# Patient Record
Sex: FEMALE | Race: WHITE | NOT HISPANIC OR LATINO | Employment: FULL TIME | URBAN - METROPOLITAN AREA
[De-identification: names, ages, dates, MRNs, and addresses within clinical notes are randomized per-mention and may not be internally consistent; named-entity substitution may affect disease eponyms.]

---

## 2020-07-01 ENCOUNTER — OFFICE VISIT (OUTPATIENT)
Dept: OBSTETRICS AND GYNECOLOGY | Facility: CLINIC | Age: 45
End: 2020-07-01

## 2020-07-01 VITALS
BODY MASS INDEX: 24.96 KG/M2 | HEIGHT: 67 IN | WEIGHT: 159 LBS | DIASTOLIC BLOOD PRESSURE: 90 MMHG | TEMPERATURE: 97.3 F | SYSTOLIC BLOOD PRESSURE: 140 MMHG

## 2020-07-01 DIAGNOSIS — N92.0 MENORRHAGIA WITH REGULAR CYCLE: ICD-10-CM

## 2020-07-01 DIAGNOSIS — D25.1 INTRAMURAL LEIOMYOMA OF UTERUS: Primary | ICD-10-CM

## 2020-07-01 PROCEDURE — 99203 OFFICE O/P NEW LOW 30 MIN: CPT | Performed by: OBSTETRICS & GYNECOLOGY

## 2020-07-01 RX ORDER — ZOLPIDEM TARTRATE 5 MG/1
1 TABLET ORAL NIGHTLY
COMMUNITY
Start: 2020-06-26 | End: 2020-08-27

## 2020-07-01 RX ORDER — TRIAMTERENE AND HYDROCHLOROTHIAZIDE 37.5; 25 MG/1; MG/1
1 TABLET ORAL AS NEEDED
COMMUNITY

## 2020-07-01 RX ORDER — ELECTROLYTES/DEXTROSE
1 SOLUTION, ORAL ORAL DAILY
COMMUNITY
End: 2020-09-12 | Stop reason: HOSPADM

## 2020-07-01 NOTE — PROGRESS NOTES
Chief Complaint   Patient presents with   • Gynecologic Exam     patient has been seeing GYN in [place]. had pelvic ultrasound last week that showed fibroids and adenomyosis.  patient has been told that she needs hysterectomy.  pap 2020.       Elana Orantes is a 44 y.o. year old  presenting to be seen in consultation from KANDIS Humphrey for evaluation of heavy menses and uterine leiomyoma.  This patient has been told by her gynecologist in [place] that she needs a hysterectomy.  She has had 3 vaginal deliveries which were uncomplicated.  Her  has had a vasectomy.  She has completed her childbearing.  Her menses have become extremely heavy with passage of clots, lasting 5 days.  She has cramping with her periods.  She denies intermenstrual bleeding.  If she has surgery she prefers to have it here.  She has a history of nephrolithiasis, with prior basket retrieval of the stone.  She has no urinary symptoms.  She has no bowel symptoms.    Social History     Substance and Sexual Activity   Sexual Activity Yes   • Partners: Male   • Birth control/protection: Surgical    Comment:  vasectomy     SCREENING TESTS    Year 2012 2016 2017 2018 2019 2020   Age                         PAP         Neg.                HPV high risk                         Mammogram                         GURPREET score                         Breast MRI                         Lipids                         Vitamin D                         Colonoscopy                         DEXA  Frax (hip/any)                         Ovarian Screen                             No Additional Complaints Reported    The following portions of the patient's history were reviewed and updated as appropriate:vital signs and   She  has a past medical history of Fibroid, HPV (human papilloma virus) infection, and Kidney stone.  She does not have any pertinent  "problems on file.  She  has a past surgical history that includes Kidney stone surgery.  Her family history includes Coronary artery disease in her father; Diabetes in her father; Neurofibromatosis in her father.  She  reports that she has never smoked. She has never used smokeless tobacco. She reports that she does not drink alcohol or use drugs.  Current Outpatient Medications   Medication Sig Dispense Refill   • Multiple Vitamins-Minerals (MULTIVITAMIN ADULT) tablet Take 1 tablet by mouth Daily.     • Probiotic Product (PROBIOTIC-10 PO) Take 1 tablet by mouth Daily.     • triamterene-hydrochlorothiazide (MAXZIDE-25) 37.5-25 MG per tablet Take 1 tablet by mouth As Needed.     • zolpidem (AMBIEN) 5 MG tablet Take 1 tablet by mouth Every Night.       No current facility-administered medications for this visit.      She has No Known Allergies..        Review of Systems  A review of systems was done.  She denies cough, fever, and shortness of breath.  Constitutional: negative for fever, chills, activity change, appetite change, fatigue and unexpected weight change.  Respiratory: negative  Cardiovascular: negative  Gastrointestinal: negative  Genitourinary:negative  Musculoskeletal:negative  Behavioral/Psych: negative          /90   Temp 97.3 °F (36.3 °C)   Ht 170.2 cm (67\")   Wt 72.1 kg (159 lb)   LMP 06/29/2020 (Exact Date)   Breastfeeding No   BMI 24.90 kg/m²     Physical Exam    General:  alert; cooperative; well developed; well nourished   Skin:  No suspicious lesions seen   Thyroid: normal to inspection and palpation   Lungs:  clear to auscultation bilaterally   Heart:  regular rate and rhythm, S1, S2 normal, no murmur, click, rub or gallop   Breasts:  Not performed.  by request   Abdomen: soft, non-tender; no masses  no umbilical or inguinal hernias are present  no hepato-splenomegaly   Pelvis: Clinical staff was present for exam  External genitalia:  normal appearance of the external genitalia " including Bartholin's and [place]'s glands.  Vaginal:  normal pink mucosa without prolapse or lesions. blood present -  dark red;  Cervix:  normal appearance.  Uterus:  asymmetrically enlarged, consistent with 6-7 weeks size;  Adnexa:  normal bimanual exam of the adnexa.  Rectal:  anus visually normal appearing. recto-vaginal exam unremarkable and confirms findings;     Lab Review   No data reviewed    Imaging   No data reviewed    Medical counseling was given to patient for the following topics: diagnostic results, instructions for management, risk factor reductions, prognosis, impressions and risks and benefits of treatment options . Total time of the encounter was 34 minute(s) and 21 minute(s)  was spent in face to face counseling.  I have advised the patient that she has an enlarged uterus which is irregular and consistent with leiomyomata.  I have advised her that this is likely the cause of her heavy menses with passage of clots.  I have reviewed with the patient options of therapy including hysteroscopy with attempted myomectomy versus minimally-invasive hysterectomy.  The patient desires removal of her uterus/cervix/fallopian tubes.  She is uncertain about whether she desires to have her ovaries removed.  She has numerous questions about the surgical procedure and I have reviewed with her robotic-assistance.  I discussed the surgical risks of bowel, bladder, ureteral injury; bleeding, infection, and anesthetic risks.  The patient will watch the video of robotic hysterectomy today.  She voiced understanding of surgical risks.  She desires to be scheduled.          ASSESSMENT  Problems Addressed this Visit        Genitourinary    Intramural leiomyoma of uterus - Primary    Menorrhagia with regular cycle           Substance History:    reports that she has never smoked. She has never used smokeless tobacco.    reports that she does not drink alcohol.    reports that she does not use drugs.    Substance use  counseling is not indicated based on patient history.      PLAN    · Medications prescribed this encounter:  No orders of the defined types were placed in this encounter.  · Information about robotically-assisted total laparoscopic hysterectomy.  · The patient will obtain her ultrasound report.  She desires to be scheduled at [facility].  · Follow up: 8 week(s)  · Calcium, 600 mg/ Vit. D, 400 IU daily     *Please note that portions of this documentation may have been completed with a voice recognition program.  Efforts were made to edit this dictation, but occasional words may have been mistranscribed.     This note was electronically signed.    SALUD Buck MD  July 1, 2020  09:47

## 2020-07-20 ENCOUNTER — TELEPHONE (OUTPATIENT)
Dept: OBSTETRICS AND GYNECOLOGY | Facility: CLINIC | Age: 45
End: 2020-07-20

## 2020-07-20 NOTE — TELEPHONE ENCOUNTER
I have called the patient to indicate that we have reviewed the disc from her pelvic ultrasound done in [facility].  She had been told there that she had an intramural uterine fibroid.  The ultrasound does not appear to confirm that but there is an anechoic space in the posterior myometrium.  The endometrial stripe is good.  Her ovaries are normal.  She has developed menorrhagia with extremely heavy flow and passage of clots lasting 5 days each month.  Her  is previously had a vasectomy.  It is her desire to have her uterus, cervix, and fallopian tubes removed.  We have discussed a minimally-invasive approach with robotic assistance.  She desires to proceed with this procedure.  I have recommended that she retain her ovaries.

## 2020-08-27 ENCOUNTER — APPOINTMENT (OUTPATIENT)
Dept: PREADMISSION TESTING | Facility: HOSPITAL | Age: 45
End: 2020-08-27

## 2020-08-27 ENCOUNTER — OFFICE VISIT (OUTPATIENT)
Dept: OBSTETRICS AND GYNECOLOGY | Facility: CLINIC | Age: 45
End: 2020-08-27

## 2020-08-27 VITALS
SYSTOLIC BLOOD PRESSURE: 120 MMHG | HEIGHT: 67 IN | OXYGEN SATURATION: 98 % | WEIGHT: 161.6 LBS | BODY MASS INDEX: 25.36 KG/M2 | TEMPERATURE: 98 F | DIASTOLIC BLOOD PRESSURE: 80 MMHG | HEART RATE: 82 BPM

## 2020-08-27 VITALS — WEIGHT: 161.38 LBS | HEIGHT: 67 IN | BODY MASS INDEX: 25.33 KG/M2

## 2020-08-27 DIAGNOSIS — D25.1 INTRAMURAL LEIOMYOMA OF UTERUS: ICD-10-CM

## 2020-08-27 DIAGNOSIS — Z01.818 PREOPERATIVE TESTING: ICD-10-CM

## 2020-08-27 DIAGNOSIS — N92.0 MENORRHAGIA WITH REGULAR CYCLE: Primary | ICD-10-CM

## 2020-08-27 LAB
ALBUMIN SERPL-MCNC: 4.4 G/DL (ref 3.5–5.2)
ALBUMIN/GLOB SERPL: 2.4 G/DL
ALP SERPL-CCNC: 61 U/L (ref 39–117)
ALT SERPL W P-5'-P-CCNC: 14 U/L (ref 1–33)
ANION GAP SERPL CALCULATED.3IONS-SCNC: 7 MMOL/L (ref 5–15)
AST SERPL-CCNC: 15 U/L (ref 1–32)
BACTERIA UR QL AUTO: ABNORMAL /HPF
BASOPHILS # BLD AUTO: 0.07 10*3/MM3 (ref 0–0.2)
BASOPHILS NFR BLD AUTO: 1.4 % (ref 0–1.5)
BILIRUB SERPL-MCNC: 0.2 MG/DL (ref 0–1.2)
BILIRUB UR QL STRIP: NEGATIVE
BUN SERPL-MCNC: 12 MG/DL (ref 6–20)
BUN/CREAT SERPL: 13.3 (ref 7–25)
CALCIUM SPEC-SCNC: 8.9 MG/DL (ref 8.6–10.5)
CHLORIDE SERPL-SCNC: 105 MMOL/L (ref 98–107)
CLARITY UR: CLEAR
CO2 SERPL-SCNC: 28 MMOL/L (ref 22–29)
COLOR UR: YELLOW
CREAT SERPL-MCNC: 0.9 MG/DL (ref 0.57–1)
DEPRECATED RDW RBC AUTO: 42.7 FL (ref 37–54)
EOSINOPHIL # BLD AUTO: 0.27 10*3/MM3 (ref 0–0.4)
EOSINOPHIL NFR BLD AUTO: 5.2 % (ref 0.3–6.2)
ERYTHROCYTE [DISTWIDTH] IN BLOOD BY AUTOMATED COUNT: 12.4 % (ref 12.3–15.4)
GFR SERPL CREATININE-BSD FRML MDRD: 68 ML/MIN/1.73
GLOBULIN UR ELPH-MCNC: 1.8 GM/DL
GLUCOSE SERPL-MCNC: 87 MG/DL (ref 65–99)
GLUCOSE UR STRIP-MCNC: NEGATIVE MG/DL
HCT VFR BLD AUTO: 37.7 % (ref 34–46.6)
HGB BLD-MCNC: 12.6 G/DL (ref 12–15.9)
HGB UR QL STRIP.AUTO: ABNORMAL
HYALINE CASTS UR QL AUTO: ABNORMAL /LPF
IMM GRANULOCYTES # BLD AUTO: 0.01 10*3/MM3 (ref 0–0.05)
IMM GRANULOCYTES NFR BLD AUTO: 0.2 % (ref 0–0.5)
KETONES UR QL STRIP: NEGATIVE
LEUKOCYTE ESTERASE UR QL STRIP.AUTO: ABNORMAL
LYMPHOCYTES # BLD AUTO: 1.25 10*3/MM3 (ref 0.7–3.1)
LYMPHOCYTES NFR BLD AUTO: 24.3 % (ref 19.6–45.3)
MCH RBC QN AUTO: 31.5 PG (ref 26.6–33)
MCHC RBC AUTO-ENTMCNC: 33.4 G/DL (ref 31.5–35.7)
MCV RBC AUTO: 94.3 FL (ref 79–97)
MONOCYTES # BLD AUTO: 0.47 10*3/MM3 (ref 0.1–0.9)
MONOCYTES NFR BLD AUTO: 9.1 % (ref 5–12)
NEUTROPHILS NFR BLD AUTO: 3.08 10*3/MM3 (ref 1.7–7)
NEUTROPHILS NFR BLD AUTO: 59.8 % (ref 42.7–76)
NITRITE UR QL STRIP: NEGATIVE
NRBC BLD AUTO-RTO: 0 /100 WBC (ref 0–0.2)
PH UR STRIP.AUTO: 8 [PH] (ref 5–8)
PLATELET # BLD AUTO: 291 10*3/MM3 (ref 140–450)
PMV BLD AUTO: 9.7 FL (ref 6–12)
POTASSIUM SERPL-SCNC: 4.3 MMOL/L (ref 3.5–5.2)
PROT SERPL-MCNC: 6.2 G/DL (ref 6–8.5)
PROT UR QL STRIP: NEGATIVE
RBC # BLD AUTO: 4 10*6/MM3 (ref 3.77–5.28)
RBC # UR: ABNORMAL /HPF
REF LAB TEST METHOD: ABNORMAL
SODIUM SERPL-SCNC: 140 MMOL/L (ref 136–145)
SP GR UR STRIP: 1.01 (ref 1–1.03)
SQUAMOUS #/AREA URNS HPF: ABNORMAL /HPF
UROBILINOGEN UR QL STRIP: ABNORMAL
WBC # BLD AUTO: 5.15 10*3/MM3 (ref 3.4–10.8)
WBC UR QL AUTO: ABNORMAL /HPF

## 2020-08-27 PROCEDURE — 85025 COMPLETE CBC W/AUTO DIFF WBC: CPT | Performed by: OBSTETRICS & GYNECOLOGY

## 2020-08-27 PROCEDURE — S0260 H&P FOR SURGERY: HCPCS | Performed by: OBSTETRICS & GYNECOLOGY

## 2020-08-27 PROCEDURE — 81001 URINALYSIS AUTO W/SCOPE: CPT | Performed by: OBSTETRICS & GYNECOLOGY

## 2020-08-27 PROCEDURE — 80053 COMPREHEN METABOLIC PANEL: CPT | Performed by: OBSTETRICS & GYNECOLOGY

## 2020-08-27 PROCEDURE — 36415 COLL VENOUS BLD VENIPUNCTURE: CPT

## 2020-08-27 RX ORDER — OXYCODONE AND ACETAMINOPHEN 7.5; 325 MG/1; MG/1
1 TABLET ORAL EVERY 6 HOURS PRN
Qty: 18 TABLET | Refills: 0 | Status: SHIPPED | OUTPATIENT
Start: 2020-08-31 | End: 2020-09-12 | Stop reason: HOSPADM

## 2020-08-27 RX ORDER — DOCUSATE SODIUM 100 MG/1
100 CAPSULE, LIQUID FILLED ORAL 2 TIMES DAILY
Qty: 20 CAPSULE | Refills: 0 | Status: SHIPPED | OUTPATIENT
Start: 2020-08-31

## 2020-08-27 RX ORDER — IBUPROFEN 600 MG/1
600 TABLET ORAL EVERY 6 HOURS PRN
Qty: 40 TABLET | Refills: 0 | Status: ON HOLD | OUTPATIENT
Start: 2020-08-31 | End: 2020-09-12

## 2020-08-27 RX ORDER — OMEPRAZOLE 20 MG/1
1 CAPSULE, DELAYED RELEASE ORAL DAILY
COMMUNITY
Start: 2020-08-13

## 2020-08-27 NOTE — H&P
History and Physical    Chief Complaint: Heavy menses with passage of clots and a uterine fibroid.    Elana Orantes is a 44 y.o., , , who was seen in my office on 2020 in consultation from KANDIS Humphrey, for evaluation of the above complaints.  She has been told by her gynecologist in [place] that she needs a hysterectomy and the patient does not desire to have that procedure done there.  She has had 3 uncomplicated vaginal deliveries.  Her  has had a vasectomy.  Her menses are extremely heavy with passage of clots.  She does not have intermenstrual bleeding.  Her pelvic ultrasound confirmed an intramural uterine leiomyoma.  She desires removal of her uterus/cervix/fallopian tubes and desires to retain her ovaries.  I have recommended a minimally-invasive approach to surgery.  She has watched an informational tape about robotic hysterectomy.  The surgical risks of bowel, bladder, ureteral injury; bleeding, infection, and anesthetic risks have been explained to the patient.  She voiced understanding of these risks.  Informed consent was signed.  She does have a history of nephrolithiasis.    Past Medical History:   Diagnosis Date   • Fibroid    • HPV (human papilloma virus) infection    • Kidney stone        Allergies: Patient has no known allergies.    Medications:   Current Outpatient Medications:   •  [START ON 2020] docusate sodium (Colace) 100 MG capsule, Take 1 capsule by mouth 2 (Two) Times a Day., Disp: 20 capsule, Rfl: 0  •  [START ON 2020] ibuprofen (ADVIL,MOTRIN) 600 MG tablet, Take 1 tablet by mouth Every 6 (Six) Hours As Needed for Mild Pain  for up to 10 days., Disp: 40 tablet, Rfl: 0  •  Multiple Vitamins-Minerals (MULTIVITAMIN ADULT) tablet, Take 1 tablet by mouth Daily., Disp: , Rfl:   •  omeprazole (priLOSEC) 20 MG capsule, Take 1 capsule by mouth Daily., Disp: , Rfl:   •  [START ON 2020] oxyCODONE-acetaminophen (Percocet) 7.5-325 MG per tablet, Take 1  tablet by mouth Every 6 (Six) Hours As Needed for Moderate Pain ., Disp: 16 tablet, Rfl: 0  •  Probiotic Product (PROBIOTIC-10 PO), Take 1 tablet by mouth Daily., Disp: , Rfl:   •  triamterene-hydrochlorothiazide (MAXZIDE-25) 37.5-25 MG per tablet, Take 1 tablet by mouth As Needed., Disp: , Rfl:     Previous Surgery:   Past Surgical History:   Procedure Laterality Date   • KIDNEY STONE SURGERY     • LAPAROSCOPIC CHOLECYSTECTOMY         Review of Systems  A review of systems was negative.  She denies cough, fever, shortness of breath, and loss of her sense of taste or smell.  Constitutional: negative for fever, chills, activity change, appetite change, fatigue and unexpected weight change.  Respiratory: negative  Cardiovascular: negative  Gastrointestinal: negative  Genitourinary:negative  Musculoskeletal:negative  Behavioral/Psych: negative      Social History     Tobacco Use   • Smoking status: Never Smoker   • Smokeless tobacco: Never Used   Substance Use Topics   • Alcohol use: Never     Frequency: Never       Recent Vitals       7/1/2020 7/1/2020 8/27/2020       BP:  --  140/90  120/80     Pulse:  --  --  82     Temp:  97.3 °F (36.3 °C)  97.3 °F (36.3 °C)  98 °F (36.7 °C)     Weight:  72.1 kg (159 lb)  72.1 kg (159 lb)  73.3 kg (161 lb 9.6 oz)     BMI (Calculated):  24.9  24.9  25.3           Physical Exam  General:  alert; cooperative; well developed; well nourished   Skin:  No suspicious lesions seen   Thyroid: normal to inspection and palpation   Lungs:  clear to auscultation bilaterally   Heart:  regular rate and rhythm, S1, S2 normal, no murmur, click, rub or gallop   Breasts:  Not performed.   Abdomen: soft, non-tender; no masses  no umbilical or inguinal hernias are present  no hepato-splenomegaly   Pelvis: Clinical staff was present for exam  External genitalia:  normal appearance of the external genitalia including Bartholin's and [place]'s glands.  Vaginal:  normal pink mucosa without prolapse or  lesions.  Cervix:  normal appearance.  Uterus:  asymmetrically enlarged, consistent with 8 weeks size;  Adnexa:  normal bimanual exam of the adnexa.  Rectal:  anus visually normal appearing. recto-vaginal exam unremarkable and confirms findings;         Injury to bowel, Injury to bladder, Injury to ureter, bleeding, infection and anesthestic risks were explained to the patient and she voiced understanding. Informed consent was signed.    No contraindications to planned surgery were detected      Impression: 1) Menorrhagia with regular cycles [N92.0]; 2) Intramural uterine leiomyoma [D25.1]        Plan: 1) Robotically-assisted total laparoscopic hysterectomy/bilateral salpingectomy/vaginal cuff suspension to uterosacral ligaments      *Please note that portions of this documentation may have been completed with a voice recognition program.  Efforts were made to edit this dictation, but occasional words may have been mistranscribed.  This note was electronically signed.    SALUD Buck MD  August 27, 2020  14:47

## 2020-08-28 ENCOUNTER — APPOINTMENT (OUTPATIENT)
Dept: PREADMISSION TESTING | Facility: HOSPITAL | Age: 45
End: 2020-08-28

## 2020-08-28 PROCEDURE — U0002 COVID-19 LAB TEST NON-CDC: HCPCS

## 2020-08-28 PROCEDURE — C9803 HOPD COVID-19 SPEC COLLECT: HCPCS

## 2020-08-28 PROCEDURE — U0004 COV-19 TEST NON-CDC HGH THRU: HCPCS

## 2020-08-29 LAB
REF LAB TEST METHOD: NORMAL
SARS-COV-2 RNA RESP QL NAA+PROBE: NOT DETECTED

## 2020-08-30 ENCOUNTER — ANESTHESIA EVENT (OUTPATIENT)
Dept: PERIOP | Facility: HOSPITAL | Age: 45
End: 2020-08-30

## 2020-08-30 RX ORDER — FAMOTIDINE 10 MG/ML
20 INJECTION, SOLUTION INTRAVENOUS ONCE
Status: CANCELLED | OUTPATIENT
Start: 2020-08-30 | End: 2020-08-30

## 2020-08-31 ENCOUNTER — ANESTHESIA (OUTPATIENT)
Dept: PERIOP | Facility: HOSPITAL | Age: 45
End: 2020-08-31

## 2020-08-31 ENCOUNTER — HOSPITAL ENCOUNTER (OUTPATIENT)
Facility: HOSPITAL | Age: 45
Discharge: HOME | End: 2020-08-31
Attending: OBSTETRICS & GYNECOLOGY | Admitting: OBSTETRICS & GYNECOLOGY

## 2020-08-31 VITALS
BODY MASS INDEX: 25.27 KG/M2 | TEMPERATURE: 97.4 F | SYSTOLIC BLOOD PRESSURE: 115 MMHG | RESPIRATION RATE: 16 BRPM | HEIGHT: 67 IN | HEART RATE: 84 BPM | DIASTOLIC BLOOD PRESSURE: 76 MMHG | OXYGEN SATURATION: 100 % | WEIGHT: 161 LBS

## 2020-08-31 DIAGNOSIS — D25.1 INTRAMURAL LEIOMYOMA OF UTERUS: ICD-10-CM

## 2020-08-31 DIAGNOSIS — N92.0 MENORRHAGIA WITH REGULAR CYCLE: ICD-10-CM

## 2020-08-31 LAB
B-HCG UR QL: NEGATIVE
INTERNAL NEGATIVE CONTROL: NEGATIVE
INTERNAL POSITIVE CONTROL: POSITIVE
Lab: NORMAL
POTASSIUM SERPL-SCNC: 3.8 MMOL/L (ref 3.5–5.2)

## 2020-08-31 PROCEDURE — 58571 TLH W/T/O 250 G OR LESS: CPT

## 2020-08-31 PROCEDURE — 25010000002 CEFOXITIN PER 1 G: Performed by: OBSTETRICS & GYNECOLOGY

## 2020-08-31 PROCEDURE — 25010000002 FENTANYL CITRATE (PF) 100 MCG/2ML SOLUTION: Performed by: NURSE ANESTHETIST, CERTIFIED REGISTERED

## 2020-08-31 PROCEDURE — C9290 INJ, BUPIVACAINE LIPOSOME: HCPCS | Performed by: OBSTETRICS & GYNECOLOGY

## 2020-08-31 PROCEDURE — 84132 ASSAY OF SERUM POTASSIUM: CPT | Performed by: ANESTHESIOLOGY

## 2020-08-31 PROCEDURE — 25010000002 PROPOFOL 10 MG/ML EMULSION: Performed by: NURSE ANESTHETIST, CERTIFIED REGISTERED

## 2020-08-31 PROCEDURE — 25010000002 ONDANSETRON PER 1 MG: Performed by: NURSE ANESTHETIST, CERTIFIED REGISTERED

## 2020-08-31 PROCEDURE — 88307 TISSUE EXAM BY PATHOLOGIST: CPT | Performed by: OBSTETRICS & GYNECOLOGY

## 2020-08-31 PROCEDURE — 25010000003 BUPIVACAINE LIPOSOME 1.3 % SUSPENSION: Performed by: OBSTETRICS & GYNECOLOGY

## 2020-08-31 PROCEDURE — 94799 UNLISTED PULMONARY SVC/PX: CPT

## 2020-08-31 PROCEDURE — 81025 URINE PREGNANCY TEST: CPT | Performed by: ANESTHESIOLOGY

## 2020-08-31 PROCEDURE — 58571 TLH W/T/O 250 G OR LESS: CPT | Performed by: OBSTETRICS & GYNECOLOGY

## 2020-08-31 PROCEDURE — 57283 COLPOPEXY INTRAPERITONEAL: CPT | Performed by: OBSTETRICS & GYNECOLOGY

## 2020-08-31 PROCEDURE — 25010000002 KETOROLAC TROMETHAMINE PER 15 MG: Performed by: NURSE ANESTHETIST, CERTIFIED REGISTERED

## 2020-08-31 PROCEDURE — 25010000002 DEXAMETHASONE SODIUM PHOSPHATE 10 MG/ML SOLUTION: Performed by: NURSE ANESTHETIST, CERTIFIED REGISTERED

## 2020-08-31 PROCEDURE — 25010000002 NEOSTIGMINE 10 MG/10ML SOLUTION: Performed by: NURSE ANESTHETIST, CERTIFIED REGISTERED

## 2020-08-31 PROCEDURE — 57283 COLPOPEXY INTRAPERITONEAL: CPT

## 2020-08-31 DEVICE — DEV CONTRL TISS STRATAFIX SPIRAL PD V7 SH 2-0 1/2 30CM: Type: IMPLANTABLE DEVICE | Status: FUNCTIONAL

## 2020-08-31 DEVICE — SEALANT WND FIBRIN TISSEEL PREFIL/SYR/PRIMAFZ 4ML: Type: IMPLANTABLE DEVICE | Status: FUNCTIONAL

## 2020-08-31 RX ORDER — OXYCODONE AND ACETAMINOPHEN 7.5; 325 MG/1; MG/1
1 TABLET ORAL EVERY 4 HOURS PRN
Status: DISCONTINUED | OUTPATIENT
Start: 2020-08-31 | End: 2020-08-31 | Stop reason: HOSPADM

## 2020-08-31 RX ORDER — FAMOTIDINE 20 MG/1
20 TABLET, FILM COATED ORAL ONCE
Status: DISCONTINUED | OUTPATIENT
Start: 2020-08-31 | End: 2020-08-31

## 2020-08-31 RX ORDER — ONDANSETRON 2 MG/ML
INJECTION INTRAMUSCULAR; INTRAVENOUS AS NEEDED
Status: DISCONTINUED | OUTPATIENT
Start: 2020-08-31 | End: 2020-08-31 | Stop reason: SURG

## 2020-08-31 RX ORDER — FENTANYL CITRATE 50 UG/ML
INJECTION, SOLUTION INTRAMUSCULAR; INTRAVENOUS AS NEEDED
Status: DISCONTINUED | OUTPATIENT
Start: 2020-08-31 | End: 2020-08-31 | Stop reason: SURG

## 2020-08-31 RX ORDER — PROMETHAZINE HYDROCHLORIDE 25 MG/1
12.5 TABLET ORAL EVERY 6 HOURS PRN
Status: DISCONTINUED | OUTPATIENT
Start: 2020-08-31 | End: 2020-08-31 | Stop reason: HOSPADM

## 2020-08-31 RX ORDER — IBUPROFEN 200 MG
200 TABLET ORAL EVERY 6 HOURS PRN
COMMUNITY
End: 2020-09-12 | Stop reason: HOSPADM

## 2020-08-31 RX ORDER — LIDOCAINE HYDROCHLORIDE 10 MG/ML
0.5 INJECTION, SOLUTION EPIDURAL; INFILTRATION; INTRACAUDAL; PERINEURAL ONCE AS NEEDED
Status: COMPLETED | OUTPATIENT
Start: 2020-08-31 | End: 2020-08-31

## 2020-08-31 RX ORDER — SODIUM CHLORIDE, SODIUM LACTATE, POTASSIUM CHLORIDE, CALCIUM CHLORIDE 600; 310; 30; 20 MG/100ML; MG/100ML; MG/100ML; MG/100ML
9 INJECTION, SOLUTION INTRAVENOUS CONTINUOUS
Status: DISCONTINUED | OUTPATIENT
Start: 2020-08-31 | End: 2020-08-31 | Stop reason: HOSPADM

## 2020-08-31 RX ORDER — DEXAMETHASONE SODIUM PHOSPHATE 10 MG/ML
INJECTION, SOLUTION INTRAMUSCULAR; INTRAVENOUS AS NEEDED
Status: DISCONTINUED | OUTPATIENT
Start: 2020-08-31 | End: 2020-08-31 | Stop reason: SURG

## 2020-08-31 RX ORDER — LIDOCAINE HYDROCHLORIDE 10 MG/ML
0.5 INJECTION, SOLUTION EPIDURAL; INFILTRATION; INTRACAUDAL; PERINEURAL ONCE AS NEEDED
Status: DISCONTINUED | OUTPATIENT
Start: 2020-08-31 | End: 2020-08-31

## 2020-08-31 RX ORDER — FAMOTIDINE 10 MG/ML
20 INJECTION, SOLUTION INTRAVENOUS ONCE
Status: DISCONTINUED | OUTPATIENT
Start: 2020-08-31 | End: 2020-08-31

## 2020-08-31 RX ORDER — GLYCOPYRROLATE 0.2 MG/ML
INJECTION INTRAMUSCULAR; INTRAVENOUS AS NEEDED
Status: DISCONTINUED | OUTPATIENT
Start: 2020-08-31 | End: 2020-08-31 | Stop reason: SURG

## 2020-08-31 RX ORDER — MAGNESIUM HYDROXIDE 1200 MG/15ML
LIQUID ORAL AS NEEDED
Status: DISCONTINUED | OUTPATIENT
Start: 2020-08-31 | End: 2020-08-31 | Stop reason: HOSPADM

## 2020-08-31 RX ORDER — LIDOCAINE HYDROCHLORIDE 10 MG/ML
INJECTION, SOLUTION EPIDURAL; INFILTRATION; INTRACAUDAL; PERINEURAL AS NEEDED
Status: DISCONTINUED | OUTPATIENT
Start: 2020-08-31 | End: 2020-08-31 | Stop reason: SURG

## 2020-08-31 RX ORDER — FENTANYL CITRATE 50 UG/ML
50 INJECTION, SOLUTION INTRAMUSCULAR; INTRAVENOUS
Status: DISCONTINUED | OUTPATIENT
Start: 2020-08-31 | End: 2020-08-31 | Stop reason: HOSPADM

## 2020-08-31 RX ORDER — SODIUM CHLORIDE, SODIUM LACTATE, POTASSIUM CHLORIDE, CALCIUM CHLORIDE 600; 310; 30; 20 MG/100ML; MG/100ML; MG/100ML; MG/100ML
9 INJECTION, SOLUTION INTRAVENOUS CONTINUOUS
Status: DISCONTINUED | OUTPATIENT
Start: 2020-08-31 | End: 2020-08-31

## 2020-08-31 RX ORDER — SODIUM CHLORIDE 0.9 % (FLUSH) 0.9 %
10 SYRINGE (ML) INJECTION AS NEEDED
Status: DISCONTINUED | OUTPATIENT
Start: 2020-08-31 | End: 2020-08-31 | Stop reason: HOSPADM

## 2020-08-31 RX ORDER — PROPOFOL 10 MG/ML
VIAL (ML) INTRAVENOUS AS NEEDED
Status: DISCONTINUED | OUTPATIENT
Start: 2020-08-31 | End: 2020-08-31 | Stop reason: SURG

## 2020-08-31 RX ORDER — SODIUM CHLORIDE 0.9 % (FLUSH) 0.9 %
10 SYRINGE (ML) INJECTION AS NEEDED
Status: DISCONTINUED | OUTPATIENT
Start: 2020-08-31 | End: 2020-08-31

## 2020-08-31 RX ORDER — NEOSTIGMINE METHYLSULFATE 1 MG/ML
INJECTION, SOLUTION INTRAVENOUS AS NEEDED
Status: DISCONTINUED | OUTPATIENT
Start: 2020-08-31 | End: 2020-08-31 | Stop reason: SURG

## 2020-08-31 RX ORDER — FAMOTIDINE 20 MG/1
20 TABLET, FILM COATED ORAL ONCE
Status: COMPLETED | OUTPATIENT
Start: 2020-08-31 | End: 2020-08-31

## 2020-08-31 RX ORDER — SODIUM CHLORIDE 9 MG/ML
INJECTION, SOLUTION INTRAVENOUS AS NEEDED
Status: DISCONTINUED | OUTPATIENT
Start: 2020-08-31 | End: 2020-08-31 | Stop reason: HOSPADM

## 2020-08-31 RX ORDER — ONDANSETRON 2 MG/ML
4 INJECTION INTRAMUSCULAR; INTRAVENOUS ONCE AS NEEDED
Status: DISCONTINUED | OUTPATIENT
Start: 2020-08-31 | End: 2020-08-31 | Stop reason: HOSPADM

## 2020-08-31 RX ORDER — HYDROMORPHONE HYDROCHLORIDE 1 MG/ML
0.5 INJECTION, SOLUTION INTRAMUSCULAR; INTRAVENOUS; SUBCUTANEOUS
Status: DISCONTINUED | OUTPATIENT
Start: 2020-08-31 | End: 2020-08-31 | Stop reason: HOSPADM

## 2020-08-31 RX ORDER — SODIUM CHLORIDE, SODIUM LACTATE, POTASSIUM CHLORIDE, CALCIUM CHLORIDE 600; 310; 30; 20 MG/100ML; MG/100ML; MG/100ML; MG/100ML
125 INJECTION, SOLUTION INTRAVENOUS CONTINUOUS
Status: DISCONTINUED | OUTPATIENT
Start: 2020-08-31 | End: 2020-08-31 | Stop reason: HOSPADM

## 2020-08-31 RX ORDER — ZOLPIDEM TARTRATE 5 MG/1
5 TABLET ORAL NIGHTLY PRN
COMMUNITY

## 2020-08-31 RX ORDER — ROCURONIUM BROMIDE 10 MG/ML
INJECTION, SOLUTION INTRAVENOUS AS NEEDED
Status: DISCONTINUED | OUTPATIENT
Start: 2020-08-31 | End: 2020-08-31 | Stop reason: SURG

## 2020-08-31 RX ORDER — KETOROLAC TROMETHAMINE 30 MG/ML
INJECTION, SOLUTION INTRAMUSCULAR; INTRAVENOUS AS NEEDED
Status: DISCONTINUED | OUTPATIENT
Start: 2020-08-31 | End: 2020-08-31 | Stop reason: SURG

## 2020-08-31 RX ORDER — NALOXONE HCL 0.4 MG/ML
0.1 VIAL (ML) INJECTION
Status: DISCONTINUED | OUTPATIENT
Start: 2020-08-31 | End: 2020-08-31 | Stop reason: HOSPADM

## 2020-08-31 RX ORDER — SCOLOPAMINE TRANSDERMAL SYSTEM 1 MG/1
1 PATCH, EXTENDED RELEASE TRANSDERMAL ONCE
Status: DISCONTINUED | OUTPATIENT
Start: 2020-08-31 | End: 2020-08-31 | Stop reason: HOSPADM

## 2020-08-31 RX ORDER — SODIUM CHLORIDE 0.9 % (FLUSH) 0.9 %
10 SYRINGE (ML) INJECTION EVERY 12 HOURS SCHEDULED
Status: DISCONTINUED | OUTPATIENT
Start: 2020-08-31 | End: 2020-08-31 | Stop reason: HOSPADM

## 2020-08-31 RX ORDER — SIMETHICONE 80 MG
80 TABLET,CHEWABLE ORAL 4 TIMES DAILY PRN
Status: DISCONTINUED | OUTPATIENT
Start: 2020-08-31 | End: 2020-08-31 | Stop reason: HOSPADM

## 2020-08-31 RX ORDER — EPHEDRINE SULFATE 50 MG/ML
INJECTION, SOLUTION INTRAVENOUS AS NEEDED
Status: DISCONTINUED | OUTPATIENT
Start: 2020-08-31 | End: 2020-08-31 | Stop reason: SURG

## 2020-08-31 RX ORDER — SODIUM CHLORIDE 0.9 % (FLUSH) 0.9 %
10 SYRINGE (ML) INJECTION EVERY 12 HOURS SCHEDULED
Status: DISCONTINUED | OUTPATIENT
Start: 2020-08-31 | End: 2020-08-31

## 2020-08-31 RX ADMIN — Medication 2 G: at 07:23

## 2020-08-31 RX ADMIN — GLYCOPYRROLATE 0.2 MG: 0.2 INJECTION INTRAMUSCULAR; INTRAVENOUS at 07:47

## 2020-08-31 RX ADMIN — FAMOTIDINE 20 MG: 20 TABLET, FILM COATED ORAL at 06:26

## 2020-08-31 RX ADMIN — NEOSTIGMINE 3 MG: 1 INJECTION INTRAVENOUS at 08:48

## 2020-08-31 RX ADMIN — FENTANYL CITRATE 50 MCG: 50 INJECTION, SOLUTION INTRAMUSCULAR; INTRAVENOUS at 07:20

## 2020-08-31 RX ADMIN — FENTANYL CITRATE 50 MCG: 50 INJECTION, SOLUTION INTRAMUSCULAR; INTRAVENOUS at 08:56

## 2020-08-31 RX ADMIN — GLYCOPYRROLATE 0.2 MG: 0.2 INJECTION INTRAMUSCULAR; INTRAVENOUS at 09:03

## 2020-08-31 RX ADMIN — ONDANSETRON 4 MG: 2 INJECTION INTRAMUSCULAR; INTRAVENOUS at 08:44

## 2020-08-31 RX ADMIN — FENTANYL CITRATE 50 MCG: 50 INJECTION, SOLUTION INTRAMUSCULAR; INTRAVENOUS at 07:41

## 2020-08-31 RX ADMIN — SODIUM CHLORIDE, POTASSIUM CHLORIDE, SODIUM LACTATE AND CALCIUM CHLORIDE 500 ML: 600; 310; 30; 20 INJECTION, SOLUTION INTRAVENOUS at 09:16

## 2020-08-31 RX ADMIN — OXYCODONE HYDROCHLORIDE AND ACETAMINOPHEN 1 TABLET: 7.5; 325 TABLET ORAL at 10:06

## 2020-08-31 RX ADMIN — SODIUM CHLORIDE, POTASSIUM CHLORIDE, SODIUM LACTATE AND CALCIUM CHLORIDE 9 ML/HR: 600; 310; 30; 20 INJECTION, SOLUTION INTRAVENOUS at 06:27

## 2020-08-31 RX ADMIN — PROPOFOL 25 MCG/KG/MIN: 10 INJECTION, EMULSION INTRAVENOUS at 07:23

## 2020-08-31 RX ADMIN — KETOROLAC TROMETHAMINE 30 MG: 30 INJECTION, SOLUTION INTRAMUSCULAR at 08:44

## 2020-08-31 RX ADMIN — SCOPALAMINE 1 PATCH: 1 PATCH, EXTENDED RELEASE TRANSDERMAL at 07:04

## 2020-08-31 RX ADMIN — GLYCOPYRROLATE 0.2 MG: 0.2 INJECTION INTRAMUSCULAR; INTRAVENOUS at 08:48

## 2020-08-31 RX ADMIN — DEXAMETHASONE SODIUM PHOSPHATE 4 MG: 10 INJECTION INTRAMUSCULAR; INTRAVENOUS at 07:23

## 2020-08-31 RX ADMIN — LIDOCAINE HYDROCHLORIDE 50 MG: 10 INJECTION, SOLUTION EPIDURAL; INFILTRATION; INTRACAUDAL; PERINEURAL at 07:23

## 2020-08-31 RX ADMIN — ROCURONIUM BROMIDE 50 MG: 10 INJECTION INTRAVENOUS at 07:23

## 2020-08-31 RX ADMIN — EPHEDRINE SULFATE 25 MG: 50 INJECTION INTRAVENOUS at 07:45

## 2020-08-31 RX ADMIN — LIDOCAINE HYDROCHLORIDE 0.5 ML: 10 INJECTION, SOLUTION EPIDURAL; INFILTRATION; INTRACAUDAL; PERINEURAL at 06:27

## 2020-08-31 RX ADMIN — PROPOFOL 200 MG: 10 INJECTION, EMULSION INTRAVENOUS at 07:23

## 2020-08-31 NOTE — ANESTHESIA POSTPROCEDURE EVALUATION
Patient: Elana Orantes    Procedure Summary     Date:  08/31/20 Room / Location:   [facility] [address] [place] [address] [facility]    Anesthesia Start:  0720 Anesthesia Stop:  0904    Procedure:  TOTAL LAPAROSCOPIC HYSTERECTOMY WITH VAGINAL VAULT SUSPENSION WITH DAVINCI ROBOT, BILATERAL SALPINGECTOMY (Bilateral Abdomen) Diagnosis:       Intramural leiomyoma of uterus      Menorrhagia with regular cycle      (Intramural leiomyoma of uterus [D25.1])      (Menorrhagia with regular cycle [N92.0])    Surgeon:  Karthik Buck MD Provider:  Arun Patricia MD    Anesthesia Type:  general ASA Status:  2          Anesthesia Type: general    Vitals  Vitals Value Taken Time   BP 94/54 8/31/2020  9:02 AM   Temp     Pulse 53 8/31/2020  9:02 AM   Resp 18 8/31/2020  9:04 AM   SpO2 100 % 8/31/2020  9:04 AM   Vitals shown include unvalidated device data.        Post Anesthesia Care and Evaluation    Patient location during evaluation: PACU  Patient participation: waiting for patient participation  Level of consciousness: responsive to physical stimuli  Pain score: 0  Pain management: adequate  Airway patency: patent  Anesthetic complications: No anesthetic complications  PONV Status: none  Cardiovascular status: hemodynamically stable and acceptable  Respiratory status: nonlabored ventilation, acceptable and nasal cannula  Hydration status: acceptable

## 2020-08-31 NOTE — ANESTHESIA PREPROCEDURE EVALUATION
Anesthesia Evaluation     Patient summary reviewed and Nursing notes reviewed   NPO Solid Status: > 8 hours  NPO Liquid Status: > 2 hours           Airway   Mallampati: II  TM distance: >3 FB  Neck ROM: full  No difficulty expected  Dental      Pulmonary    (-) COPD, asthma, recent URI, not a smoker  Cardiovascular     (-) hypertension, dysrhythmias, angina, hyperlipidemia      Neuro/Psych  (+) dizziness/light headedness, syncope,     (-) seizures, CVA  GI/Hepatic/Renal/Endo    (+)  GERD,  renal disease stones,   (-) liver disease, diabetes, no thyroid disorder    Musculoskeletal     Abdominal    Substance History      OB/GYN          Other                        Anesthesia Plan    ASA 2     general   (Propofol infusion as part of for anti PONV tech)  intravenous induction     Anesthetic plan, all risks, benefits, and alternatives have been provided, discussed and informed consent has been obtained with: patient.    Plan discussed with CRNA.

## 2020-08-31 NOTE — ANESTHESIA PROCEDURE NOTES
Airway  Urgency: elective    Date/Time: 8/31/2020 7:25 AM  Airway not difficult    General Information and Staff    Patient location during procedure: OR  CRNA: Saturnino Waggoner CRNA    Indications and Patient Condition  Indications for airway management: airway protection    Preoxygenated: yes  MILS not maintained throughout  Mask difficulty assessment: 1 - vent by mask    Final Airway Details  Final airway type: endotracheal airway      Successful airway: ETT  Cuffed: yes   Successful intubation technique: direct laryngoscopy  Endotracheal tube insertion site: oral  Blade: Fany  Blade size: 3  ETT size (mm): 7.0  Cormack-Lehane Classification: grade I - full view of glottis  Placement verified by: chest auscultation and capnometry   Measured from: lips  ETT/EBT  to lips (cm): 20  Number of attempts at approach: 1  Assessment: lips, teeth, and gum same as pre-op and atraumatic intubation    Additional Comments  Negative epigastric sounds, Breath sound equal bilaterally with symmetric chest rise and fall

## 2020-09-01 LAB
CYTO UR: NORMAL
LAB AP CASE REPORT: NORMAL
LAB AP CLINICAL INFORMATION: NORMAL
PATH REPORT.FINAL DX SPEC: NORMAL
PATH REPORT.GROSS SPEC: NORMAL

## 2020-09-10 ENCOUNTER — TELEPHONE (OUTPATIENT)
Dept: OBSTETRICS AND GYNECOLOGY | Facility: CLINIC | Age: 45
End: 2020-09-10

## 2020-09-10 NOTE — TELEPHONE ENCOUNTER
WENT TO PRIMARY CARE - GAVE HER MEDS FOR WHAT THE NURSE THOUGHT WAS INFECTION ON INCISIONS - PT STATES WAS NOT BOTHERING HER - SO THIS MORNING SHE HAS BUMPS ON THE INCISION - PLEASE CALL HER  [phone]  I

## 2020-09-10 NOTE — TELEPHONE ENCOUNTER
Dr. Buck,     Please call this patient.    This patient underwent a robotically-assisted total laparoscopic hysterectomy/bilateral salpingectomy/vaginal cuff suspension to uterosacral ligaments on 7/31/2020 at [facility].  She has had no postoperative problems but calls today indicating that yesterday she began to notice some redness around each of her 4 small incisions.  The incisions are all intact and dry.  There is no purulent drainage.  She did have adhesive glue applied to each incision and feels that there is a reaction to that.  She is not wearing Band-Aids or any adhesive tape.  Her primary care physician prescribed Keflex, however the patient is afebrile and with no evidence of infection at the site of the incisions I have suggested that she not take that medication.  I have advised her to soak the incisions in an attempt to get the glue off.  I have counseled her that she may take Benadryl for any itching or skin reaction.  She will contact me if her symptoms do not resolve.

## 2020-09-11 ENCOUNTER — APPOINTMENT (OUTPATIENT)
Dept: OTHER | Facility: HOSPITAL | Age: 45
End: 2020-09-11

## 2020-09-11 ENCOUNTER — HOSPITAL ENCOUNTER (OUTPATIENT)
Facility: HOSPITAL | Age: 45
Discharge: HOME | End: 2020-09-12
Attending: EMERGENCY MEDICINE | Admitting: UROLOGY

## 2020-09-11 ENCOUNTER — APPOINTMENT (OUTPATIENT)
Dept: GENERAL RADIOLOGY | Facility: HOSPITAL | Age: 45
End: 2020-09-11

## 2020-09-11 ENCOUNTER — DOCUMENTATION (OUTPATIENT)
Dept: OBSTETRICS AND GYNECOLOGY | Facility: CLINIC | Age: 45
End: 2020-09-11

## 2020-09-11 ENCOUNTER — ANESTHESIA EVENT (OUTPATIENT)
Dept: PERIOP | Facility: HOSPITAL | Age: 45
End: 2020-09-11

## 2020-09-11 ENCOUNTER — APPOINTMENT (OUTPATIENT)
Dept: INTERVENTIONAL RADIOLOGY/VASCULAR | Facility: HOSPITAL | Age: 45
End: 2020-09-11

## 2020-09-11 ENCOUNTER — ANESTHESIA (OUTPATIENT)
Dept: PERIOP | Facility: HOSPITAL | Age: 45
End: 2020-09-11

## 2020-09-11 DIAGNOSIS — Z90.710 STATUS POST HYSTERECTOMY: ICD-10-CM

## 2020-09-11 DIAGNOSIS — S37.10XA INJURY OF RIGHT URETER, INITIAL ENCOUNTER: Primary | ICD-10-CM

## 2020-09-11 DIAGNOSIS — D25.1 INTRAMURAL LEIOMYOMA OF UTERUS: ICD-10-CM

## 2020-09-11 DIAGNOSIS — S37.30XA INJURY OF URETHRA, INITIAL ENCOUNTER: ICD-10-CM

## 2020-09-11 DIAGNOSIS — R10.30 LOWER ABDOMINAL PAIN: ICD-10-CM

## 2020-09-11 LAB
POTASSIUM SERPL-SCNC: 4.1 MMOL/L (ref 3.5–5.2)
SARS-COV-2 RDRP RESP QL NAA+PROBE: NOT DETECTED

## 2020-09-11 PROCEDURE — G0378 HOSPITAL OBSERVATION PER HR: HCPCS

## 2020-09-11 PROCEDURE — 25010000002 HYDROMORPHONE PER 4 MG: Performed by: UROLOGY

## 2020-09-11 PROCEDURE — 25010000002 PROPOFOL 10 MG/ML EMULSION: Performed by: NURSE ANESTHETIST, CERTIFIED REGISTERED

## 2020-09-11 PROCEDURE — 84132 ASSAY OF SERUM POTASSIUM: CPT | Performed by: UROLOGY

## 2020-09-11 PROCEDURE — C1769 GUIDE WIRE: HCPCS | Performed by: UROLOGY

## 2020-09-11 PROCEDURE — 25010000002 IOPAMIDOL 61 % SOLUTION

## 2020-09-11 PROCEDURE — 25010000002 FENTANYL CITRATE (PF) 100 MCG/2ML SOLUTION: Performed by: RADIOLOGY

## 2020-09-11 PROCEDURE — 25010000002 IOPAMIDOL 61 % SOLUTION: Performed by: UROLOGY

## 2020-09-11 PROCEDURE — 99153 MOD SED SAME PHYS/QHP EA: CPT

## 2020-09-11 PROCEDURE — C1729 CATH, DRAINAGE: HCPCS

## 2020-09-11 PROCEDURE — 25010000003 LIDOCAINE 1 % SOLUTION: Performed by: NURSE ANESTHETIST, CERTIFIED REGISTERED

## 2020-09-11 PROCEDURE — 25010000002 DEXAMETHASONE PER 1 MG: Performed by: NURSE ANESTHETIST, CERTIFIED REGISTERED

## 2020-09-11 PROCEDURE — 25810000003 SODIUM CHLORIDE 0.9 % WITH KCL 20 MEQ 20-0.9 MEQ/L-% SOLUTION: Performed by: UROLOGY

## 2020-09-11 PROCEDURE — 25010000002 MIDAZOLAM PER 1 MG: Performed by: RADIOLOGY

## 2020-09-11 PROCEDURE — C1758 CATHETER, URETERAL: HCPCS | Performed by: UROLOGY

## 2020-09-11 PROCEDURE — 25010000002 FENTANYL CITRATE (PF) 100 MCG/2ML SOLUTION: Performed by: NURSE ANESTHETIST, CERTIFIED REGISTERED

## 2020-09-11 PROCEDURE — C1769 GUIDE WIRE: HCPCS

## 2020-09-11 PROCEDURE — 99152 MOD SED SAME PHYS/QHP 5/>YRS: CPT

## 2020-09-11 PROCEDURE — 25010000002 CEFOXITIN PER 1 G: Performed by: UROLOGY

## 2020-09-11 PROCEDURE — 76000 FLUOROSCOPY <1 HR PHYS/QHP: CPT

## 2020-09-11 PROCEDURE — 25010000002 HYDROMORPHONE 1 MG/ML SOLUTION: Performed by: UROLOGY

## 2020-09-11 PROCEDURE — 25010000002 DIPHENHYDRAMINE PER 50 MG

## 2020-09-11 PROCEDURE — 87635 SARS-COV-2 COVID-19 AMP PRB: CPT | Performed by: EMERGENCY MEDICINE

## 2020-09-11 PROCEDURE — 25010000002 ONDANSETRON PER 1 MG: Performed by: NURSE ANESTHETIST, CERTIFIED REGISTERED

## 2020-09-11 PROCEDURE — 99284 EMERGENCY DEPT VISIT MOD MDM: CPT

## 2020-09-11 RX ORDER — KETOROLAC TROMETHAMINE 30 MG/ML
30 INJECTION, SOLUTION INTRAMUSCULAR; INTRAVENOUS EVERY 6 HOURS PRN
Status: DISCONTINUED | OUTPATIENT
Start: 2020-09-11 | End: 2020-09-12 | Stop reason: HOSPADM

## 2020-09-11 RX ORDER — HYDROMORPHONE HYDROCHLORIDE 1 MG/ML
0.5 INJECTION, SOLUTION INTRAMUSCULAR; INTRAVENOUS; SUBCUTANEOUS
Status: DISCONTINUED | OUTPATIENT
Start: 2020-09-11 | End: 2020-09-12 | Stop reason: HOSPADM

## 2020-09-11 RX ORDER — SODIUM CHLORIDE 0.9 % (FLUSH) 0.9 %
10 SYRINGE (ML) INJECTION AS NEEDED
Status: DISCONTINUED | OUTPATIENT
Start: 2020-09-11 | End: 2020-09-11 | Stop reason: HOSPADM

## 2020-09-11 RX ORDER — FENTANYL CITRATE 50 UG/ML
INJECTION, SOLUTION INTRAMUSCULAR; INTRAVENOUS AS NEEDED
Status: DISCONTINUED | OUTPATIENT
Start: 2020-09-11 | End: 2020-09-11 | Stop reason: SURG

## 2020-09-11 RX ORDER — DEXAMETHASONE SODIUM PHOSPHATE 4 MG/ML
INJECTION, SOLUTION INTRA-ARTICULAR; INTRALESIONAL; INTRAMUSCULAR; INTRAVENOUS; SOFT TISSUE AS NEEDED
Status: DISCONTINUED | OUTPATIENT
Start: 2020-09-11 | End: 2020-09-11 | Stop reason: SURG

## 2020-09-11 RX ORDER — SODIUM CHLORIDE, SODIUM LACTATE, POTASSIUM CHLORIDE, CALCIUM CHLORIDE 600; 310; 30; 20 MG/100ML; MG/100ML; MG/100ML; MG/100ML
9 INJECTION, SOLUTION INTRAVENOUS CONTINUOUS
Status: DISCONTINUED | OUTPATIENT
Start: 2020-09-11 | End: 2020-09-12 | Stop reason: HOSPADM

## 2020-09-11 RX ORDER — LIDOCAINE HYDROCHLORIDE 10 MG/ML
0.5 INJECTION, SOLUTION EPIDURAL; INFILTRATION; INTRACAUDAL; PERINEURAL ONCE AS NEEDED
Status: CANCELLED | OUTPATIENT
Start: 2020-09-11

## 2020-09-11 RX ORDER — FENTANYL CITRATE 50 UG/ML
INJECTION, SOLUTION INTRAMUSCULAR; INTRAVENOUS
Status: COMPLETED | OUTPATIENT
Start: 2020-09-11 | End: 2020-09-11

## 2020-09-11 RX ORDER — PROPOFOL 10 MG/ML
VIAL (ML) INTRAVENOUS AS NEEDED
Status: DISCONTINUED | OUTPATIENT
Start: 2020-09-11 | End: 2020-09-11 | Stop reason: SURG

## 2020-09-11 RX ORDER — ONDANSETRON 2 MG/ML
INJECTION INTRAMUSCULAR; INTRAVENOUS AS NEEDED
Status: DISCONTINUED | OUTPATIENT
Start: 2020-09-11 | End: 2020-09-11 | Stop reason: SURG

## 2020-09-11 RX ORDER — FENTANYL CITRATE 50 UG/ML
INJECTION, SOLUTION INTRAMUSCULAR; INTRAVENOUS
Status: DISPENSED
Start: 2020-09-11 | End: 2020-09-12

## 2020-09-11 RX ORDER — GABAPENTIN 100 MG/1
100 CAPSULE ORAL 3 TIMES DAILY
Status: DISCONTINUED | OUTPATIENT
Start: 2020-09-11 | End: 2020-09-12 | Stop reason: HOSPADM

## 2020-09-11 RX ORDER — SODIUM CHLORIDE AND POTASSIUM CHLORIDE 150; 900 MG/100ML; MG/100ML
100 INJECTION, SOLUTION INTRAVENOUS CONTINUOUS
Status: DISCONTINUED | OUTPATIENT
Start: 2020-09-11 | End: 2020-09-12 | Stop reason: HOSPADM

## 2020-09-11 RX ORDER — ONDANSETRON 4 MG/1
4 TABLET, FILM COATED ORAL EVERY 6 HOURS PRN
Status: DISCONTINUED | OUTPATIENT
Start: 2020-09-11 | End: 2020-09-12 | Stop reason: HOSPADM

## 2020-09-11 RX ORDER — DIPHENHYDRAMINE HYDROCHLORIDE 50 MG/ML
12.5 INJECTION INTRAMUSCULAR; INTRAVENOUS ONCE
Status: COMPLETED | OUTPATIENT
Start: 2020-09-11 | End: 2020-09-11

## 2020-09-11 RX ORDER — ONDANSETRON 2 MG/ML
4 INJECTION INTRAMUSCULAR; INTRAVENOUS EVERY 6 HOURS PRN
Status: DISCONTINUED | OUTPATIENT
Start: 2020-09-11 | End: 2020-09-12 | Stop reason: HOSPADM

## 2020-09-11 RX ORDER — ONDANSETRON 2 MG/ML
4 INJECTION INTRAMUSCULAR; INTRAVENOUS ONCE AS NEEDED
Status: DISCONTINUED | OUTPATIENT
Start: 2020-09-11 | End: 2020-09-11 | Stop reason: HOSPADM

## 2020-09-11 RX ORDER — LABETALOL HYDROCHLORIDE 5 MG/ML
10 INJECTION, SOLUTION INTRAVENOUS EVERY 4 HOURS PRN
Status: DISCONTINUED | OUTPATIENT
Start: 2020-09-11 | End: 2020-09-12 | Stop reason: HOSPADM

## 2020-09-11 RX ORDER — ACETAMINOPHEN 160 MG/5ML
650 SOLUTION ORAL EVERY 6 HOURS
Status: DISCONTINUED | OUTPATIENT
Start: 2020-09-11 | End: 2020-09-12 | Stop reason: HOSPADM

## 2020-09-11 RX ORDER — FENTANYL CITRATE 50 UG/ML
50 INJECTION, SOLUTION INTRAMUSCULAR; INTRAVENOUS
Status: DISCONTINUED | OUTPATIENT
Start: 2020-09-11 | End: 2020-09-11 | Stop reason: HOSPADM

## 2020-09-11 RX ORDER — PANTOPRAZOLE SODIUM 40 MG/1
40 TABLET, DELAYED RELEASE ORAL DAILY
Status: DISCONTINUED | OUTPATIENT
Start: 2020-09-11 | End: 2020-09-12 | Stop reason: HOSPADM

## 2020-09-11 RX ORDER — MIDAZOLAM HYDROCHLORIDE 1 MG/ML
INJECTION INTRAMUSCULAR; INTRAVENOUS
Status: DISPENSED
Start: 2020-09-11 | End: 2020-09-12

## 2020-09-11 RX ORDER — DIPHENHYDRAMINE HYDROCHLORIDE 50 MG/ML
INJECTION INTRAMUSCULAR; INTRAVENOUS
Status: COMPLETED
Start: 2020-09-11 | End: 2020-09-11

## 2020-09-11 RX ORDER — LABETALOL HYDROCHLORIDE 5 MG/ML
5 INJECTION, SOLUTION INTRAVENOUS
Status: DISCONTINUED | OUTPATIENT
Start: 2020-09-11 | End: 2020-09-11 | Stop reason: HOSPADM

## 2020-09-11 RX ORDER — DIPHENHYDRAMINE HCL 25 MG
25 CAPSULE ORAL EVERY 6 HOURS PRN
Status: DISCONTINUED | OUTPATIENT
Start: 2020-09-11 | End: 2020-09-12 | Stop reason: HOSPADM

## 2020-09-11 RX ORDER — OXYCODONE HYDROCHLORIDE 5 MG/1
5 TABLET ORAL EVERY 4 HOURS PRN
Status: DISCONTINUED | OUTPATIENT
Start: 2020-09-11 | End: 2020-09-12 | Stop reason: HOSPADM

## 2020-09-11 RX ORDER — ZOLPIDEM TARTRATE 5 MG/1
5 TABLET ORAL NIGHTLY PRN
Status: DISCONTINUED | OUTPATIENT
Start: 2020-09-11 | End: 2020-09-12 | Stop reason: HOSPADM

## 2020-09-11 RX ORDER — LIDOCAINE HYDROCHLORIDE 10 MG/ML
INJECTION, SOLUTION INFILTRATION; PERINEURAL AS NEEDED
Status: DISCONTINUED | OUTPATIENT
Start: 2020-09-11 | End: 2020-09-11 | Stop reason: SURG

## 2020-09-11 RX ORDER — HYDROMORPHONE HYDROCHLORIDE 1 MG/ML
0.5 INJECTION, SOLUTION INTRAMUSCULAR; INTRAVENOUS; SUBCUTANEOUS
Status: DISCONTINUED | OUTPATIENT
Start: 2020-09-11 | End: 2020-09-11 | Stop reason: HOSPADM

## 2020-09-11 RX ORDER — CHOLECALCIFEROL (VITAMIN D3) 125 MCG
5 CAPSULE ORAL NIGHTLY PRN
Status: DISCONTINUED | OUTPATIENT
Start: 2020-09-11 | End: 2020-09-12 | Stop reason: HOSPADM

## 2020-09-11 RX ORDER — TRIAMTERENE AND HYDROCHLOROTHIAZIDE 37.5; 25 MG/1; MG/1
1 TABLET ORAL DAILY PRN
Status: DISCONTINUED | OUTPATIENT
Start: 2020-09-11 | End: 2020-09-12 | Stop reason: HOSPADM

## 2020-09-11 RX ORDER — MEPERIDINE HYDROCHLORIDE 25 MG/ML
12.5 INJECTION INTRAMUSCULAR; INTRAVENOUS; SUBCUTANEOUS
Status: DISCONTINUED | OUTPATIENT
Start: 2020-09-11 | End: 2020-09-11 | Stop reason: HOSPADM

## 2020-09-11 RX ORDER — NALOXONE HCL 0.4 MG/ML
0.4 VIAL (ML) INJECTION AS NEEDED
Status: DISCONTINUED | OUTPATIENT
Start: 2020-09-11 | End: 2020-09-11 | Stop reason: HOSPADM

## 2020-09-11 RX ORDER — FAMOTIDINE 20 MG/1
20 TABLET, FILM COATED ORAL ONCE
Status: COMPLETED | OUTPATIENT
Start: 2020-09-11 | End: 2020-09-11

## 2020-09-11 RX ORDER — ACETAMINOPHEN 650 MG/1
650 SUPPOSITORY RECTAL EVERY 6 HOURS
Status: DISCONTINUED | OUTPATIENT
Start: 2020-09-11 | End: 2020-09-12 | Stop reason: HOSPADM

## 2020-09-11 RX ORDER — DOCUSATE SODIUM 100 MG/1
100 CAPSULE, LIQUID FILLED ORAL 2 TIMES DAILY PRN
Status: DISCONTINUED | OUTPATIENT
Start: 2020-09-11 | End: 2020-09-12 | Stop reason: HOSPADM

## 2020-09-11 RX ORDER — MAGNESIUM HYDROXIDE 1200 MG/15ML
LIQUID ORAL AS NEEDED
Status: DISCONTINUED | OUTPATIENT
Start: 2020-09-11 | End: 2020-09-11 | Stop reason: HOSPADM

## 2020-09-11 RX ORDER — FAMOTIDINE 10 MG/ML
20 INJECTION, SOLUTION INTRAVENOUS ONCE
Status: CANCELLED | OUTPATIENT
Start: 2020-09-11 | End: 2020-09-11

## 2020-09-11 RX ORDER — NALOXONE HCL 0.4 MG/ML
0.1 VIAL (ML) INJECTION
Status: DISCONTINUED | OUTPATIENT
Start: 2020-09-11 | End: 2020-09-12 | Stop reason: HOSPADM

## 2020-09-11 RX ORDER — ACETAMINOPHEN 325 MG/1
650 TABLET ORAL EVERY 6 HOURS
Status: DISCONTINUED | OUTPATIENT
Start: 2020-09-11 | End: 2020-09-12 | Stop reason: HOSPADM

## 2020-09-11 RX ORDER — SODIUM CHLORIDE, SODIUM LACTATE, POTASSIUM CHLORIDE, CALCIUM CHLORIDE 600; 310; 30; 20 MG/100ML; MG/100ML; MG/100ML; MG/100ML
100 INJECTION, SOLUTION INTRAVENOUS CONTINUOUS
Status: DISCONTINUED | OUTPATIENT
Start: 2020-09-11 | End: 2020-09-12 | Stop reason: HOSPADM

## 2020-09-11 RX ORDER — SODIUM CHLORIDE 0.9 % (FLUSH) 0.9 %
10 SYRINGE (ML) INJECTION EVERY 12 HOURS SCHEDULED
Status: DISCONTINUED | OUTPATIENT
Start: 2020-09-11 | End: 2020-09-11 | Stop reason: HOSPADM

## 2020-09-11 RX ORDER — LIDOCAINE HYDROCHLORIDE 10 MG/ML
INJECTION, SOLUTION EPIDURAL; INFILTRATION; INTRACAUDAL; PERINEURAL
Status: COMPLETED
Start: 2020-09-11 | End: 2020-09-11

## 2020-09-11 RX ORDER — EPHEDRINE SULFATE 50 MG/ML
5 INJECTION, SOLUTION INTRAVENOUS ONCE AS NEEDED
Status: DISCONTINUED | OUTPATIENT
Start: 2020-09-11 | End: 2020-09-11 | Stop reason: HOSPADM

## 2020-09-11 RX ORDER — MIDAZOLAM HYDROCHLORIDE 1 MG/ML
INJECTION INTRAMUSCULAR; INTRAVENOUS
Status: COMPLETED | OUTPATIENT
Start: 2020-09-11 | End: 2020-09-11

## 2020-09-11 RX ADMIN — ACETAMINOPHEN 650 MG: 325 TABLET, FILM COATED ORAL at 21:33

## 2020-09-11 RX ADMIN — SODIUM CHLORIDE, POTASSIUM CHLORIDE, SODIUM LACTATE AND CALCIUM CHLORIDE 9 ML/HR: 600; 310; 30; 20 INJECTION, SOLUTION INTRAVENOUS at 06:39

## 2020-09-11 RX ADMIN — HYDROMORPHONE HYDROCHLORIDE 0.5 MG: 1 INJECTION, SOLUTION INTRAMUSCULAR; INTRAVENOUS; SUBCUTANEOUS at 14:24

## 2020-09-11 RX ADMIN — IOPAMIDOL 13 ML: 612 INJECTION, SOLUTION INTRAVENOUS at 18:45

## 2020-09-11 RX ADMIN — FENTANYL CITRATE 50 MCG: 50 INJECTION, SOLUTION INTRAMUSCULAR; INTRAVENOUS at 18:13

## 2020-09-11 RX ADMIN — GABAPENTIN 100 MG: 100 CAPSULE ORAL at 21:33

## 2020-09-11 RX ADMIN — FENTANYL CITRATE 50 MCG: 50 INJECTION, SOLUTION INTRAMUSCULAR; INTRAVENOUS at 18:23

## 2020-09-11 RX ADMIN — FENTANYL CITRATE 50 MCG: 50 INJECTION, SOLUTION INTRAMUSCULAR; INTRAVENOUS at 18:18

## 2020-09-11 RX ADMIN — CEFOXITIN 2 G: 2 INJECTION, POWDER, FOR SOLUTION INTRAVENOUS at 22:38

## 2020-09-11 RX ADMIN — HYDROMORPHONE HYDROCHLORIDE 0.5 MG: 1 INJECTION, SOLUTION INTRAMUSCULAR; INTRAVENOUS; SUBCUTANEOUS at 16:20

## 2020-09-11 RX ADMIN — OXYCODONE HYDROCHLORIDE 5 MG: 5 TABLET ORAL at 19:43

## 2020-09-11 RX ADMIN — FENTANYL CITRATE 100 MCG: 50 INJECTION, SOLUTION INTRAMUSCULAR; INTRAVENOUS at 07:05

## 2020-09-11 RX ADMIN — MIDAZOLAM 1 MG: 1 INJECTION INTRAMUSCULAR; INTRAVENOUS at 18:39

## 2020-09-11 RX ADMIN — PROPOFOL 150 MG: 10 INJECTION, EMULSION INTRAVENOUS at 07:05

## 2020-09-11 RX ADMIN — FENTANYL CITRATE 50 MCG: 50 INJECTION, SOLUTION INTRAMUSCULAR; INTRAVENOUS at 18:39

## 2020-09-11 RX ADMIN — MIDAZOLAM 1 MG: 1 INJECTION INTRAMUSCULAR; INTRAVENOUS at 18:28

## 2020-09-11 RX ADMIN — DIPHENHYDRAMINE HYDROCHLORIDE 12.5 MG: 50 INJECTION INTRAMUSCULAR; INTRAVENOUS at 17:31

## 2020-09-11 RX ADMIN — MIDAZOLAM 1 MG: 1 INJECTION INTRAMUSCULAR; INTRAVENOUS at 18:17

## 2020-09-11 RX ADMIN — MIDAZOLAM 1 MG: 1 INJECTION INTRAMUSCULAR; INTRAVENOUS at 18:23

## 2020-09-11 RX ADMIN — POTASSIUM CHLORIDE AND SODIUM CHLORIDE 100 ML/HR: 900; 150 INJECTION, SOLUTION INTRAVENOUS at 14:24

## 2020-09-11 RX ADMIN — FAMOTIDINE 20 MG: 20 TABLET, FILM COATED ORAL at 06:38

## 2020-09-11 RX ADMIN — FENTANYL CITRATE 50 MCG: 50 INJECTION, SOLUTION INTRAMUSCULAR; INTRAVENOUS at 18:28

## 2020-09-11 RX ADMIN — MIDAZOLAM 1 MG: 1 INJECTION INTRAMUSCULAR; INTRAVENOUS at 18:14

## 2020-09-11 RX ADMIN — LIDOCAINE HYDROCHLORIDE 4 ML: 10 INJECTION, SOLUTION EPIDURAL; INFILTRATION; INTRACAUDAL; PERINEURAL at 18:35

## 2020-09-11 RX ADMIN — DEXAMETHASONE SODIUM PHOSPHATE 4 MG: 4 INJECTION, SOLUTION INTRAMUSCULAR; INTRAVENOUS at 07:01

## 2020-09-11 RX ADMIN — ONDANSETRON 4 MG: 2 INJECTION INTRAMUSCULAR; INTRAVENOUS at 07:17

## 2020-09-11 RX ADMIN — CEFOXITIN SODIUM 2 G: 1 POWDER, FOR SOLUTION INTRAVENOUS at 07:01

## 2020-09-11 RX ADMIN — LIDOCAINE HYDROCHLORIDE 50 MG: 10 INJECTION, SOLUTION INFILTRATION; PERINEURAL at 07:05

## 2020-09-11 NOTE — H&P
Admission      Patient Name: Elana Orantes  MRN: [id number]  : 1975  DOS: 2020    Attending: Holger Adams Jr*    Primary Care Provider: Vanessa Asher APRN      Patient Care Team:  Vanessa Asher APRN as PCP - General (Nurse Practitioner)  Karthik Buck MD as Consulting Physician (Gynecology)    Chief complaint:  Right distal ureteral injury    Subjective   Patient is a pleasant 44 y.o. female presented to [facility] ER with complaints of right flank and low abdominal pain.  She had hysterectomy by Dr. Buck on .  She had been doing fairly well postoperatively, but reports she had felt swollen and bloated in her lower abdominal area.  She reports the pain became severe yesterday evening and she reported to the [facility] emergency room.  She underwent 2 CAT scans there and Dr. Buck was notified.  She was instructed to have her  drive her to the [facility] emergency room.  She denies fevers, chills or night sweats.  She has a history of kidney stones, last 1 about 10 years ago.  She reports one episode of urinary retention last evening, but has been able to void without difficulty since.    Dr. Bryan Price was consulted for right distal ureteral injury noted on CT scans.  The patient underwent cystoscopy, retrograde pyelogram and attempted right stent placement this morning under general anesthesia.  Unfortunately, Dr. Adams was unable to place the ureteral stent.    When seen she is doing fairly well.  She reports some pain from her right rib cage to her low back.  She denies nausea, shortness of breath or chest pain.  No history of DVT or PE.      Allergies:  No Known Allergies    Meds:  Medications Prior to Admission   Medication Sig Dispense Refill Last Dose   • docusate sodium (Colace) 100 MG capsule Take 1 capsule by mouth 2 (Two) Times a Day. (Patient taking differently: Take 100 mg by mouth 2 (Two) Times a Day. AFTER SURGERY) 20  capsule 0 Pt not taken yet   • ibuprofen (ADVIL,MOTRIN) 200 MG tablet Take 200 mg by mouth Every 6 (Six) Hours As Needed for Mild Pain .   2020   • [] ibuprofen (ADVIL,MOTRIN) 600 MG tablet Take 1 tablet by mouth Every 6 (Six) Hours As Needed for Mild Pain  for up to 10 days. (Patient taking differently: Take 600 mg by mouth Every 6 (Six) Hours As Needed for Mild Pain . AFTER SURGERY) 40 tablet 0 Pt not taken yet   • Multiple Vitamins-Minerals (MULTIVITAMIN ADULT) tablet Take 1 tablet by mouth Daily.   More than a month at Unknown time   • omeprazole (priLOSEC) 20 MG capsule Take 1 capsule by mouth Daily.   Past Week at Unknown time   • oxyCODONE-acetaminophen (Percocet) 7.5-325 MG per tablet Take 1 tablet by mouth Every 6 (Six) Hours As Needed for Moderate Pain . (Patient taking differently: Take 1 tablet by mouth Every 6 (Six) Hours As Needed for Moderate Pain . AFTER SURGERY) 18 tablet 0 Pt hasn't taken yet   • Probiotic Product (PROBIOTIC-10 PO) Take 1 tablet by mouth Daily.   2020   • triamterene-hydrochlorothiazide (MAXZIDE-25) 37.5-25 MG per tablet Take 1 tablet by mouth As Needed (EDEMA).   Past Month at Unknown time   • zolpidem (Ambien) 5 MG tablet Take 5 mg by mouth At Night As Needed for Sleep.   2020       History:   Past Medical History:   Diagnosis Date   • Dizzy    • Edema     PRN MAXIDE    • Fibroid    • GERD (gastroesophageal reflux disease)    • History of kidney stones     X2- ONCE PASSED AND ONE HAD SURGERY    • HPV (human papilloma virus) infection    • Kidney stone    • Syncope    • Wears glasses     READERS     Past Surgical History:   Procedure Laterality Date   • KIDNEY STONE SURGERY      X1    • LAPAROSCOPIC CHOLECYSTECTOMY     • TOTAL LAPAROSCOPIC HYSTERECTOMY VAGINAL VAULT SUSPENSION Bilateral 2020    Procedure: TOTAL LAPAROSCOPIC HYSTERECTOMY WITH VAGINAL VAULT SUSPENSION WITH DAVINCI ROBOT, BILATERAL SALPINGECTOMY;  Surgeon: Karthik Buck MD;   "Location: [facility] OR;  Service: [facility];  Laterality: Bilateral;   • WISDOM TOOTH EXTRACTION       Family History   Problem Relation Age of Onset   • Neurofibromatosis Father    • Diabetes Father    • Coronary artery disease Father      Social History     Tobacco Use   • Smoking status: Never Smoker   • Smokeless tobacco: Never Used   Substance Use Topics   • Alcohol use: Yes     Frequency: Never     Comment: MINOR SOCIAL    • Drug use: Never   She is  with 3 children.    Review of Systems  Pertinent items are noted in HPI    Vital Signs  /82   Pulse 77   Temp 98.3 °F (36.8 °C)   Resp 16   Ht 170.2 cm (67\")   Wt 72.6 kg (160 lb)   LMP 08/24/2020 (Exact Date)   SpO2 98%   BMI 25.06 kg/m²     Physical Exam:    General Appearance:    Alert, cooperative, in no acute distress   Head:    Normocephalic, without obvious abnormality, atraumatic   Eyes:            Lids and lashes normal, conjunctivae and sclerae normal, no   icterus, no pallor, corneas clear   Ears:    Ears appear intact with no abnormalities noted   Throat:   No oral lesions, no thrush, oral mucosa moist   Neck:   No adenopathy, supple, trachea midline, no thyromegaly    Lungs:     Clear to auscultation,respirations regular, even and       unlabored. No wheezes or rales.    Heart:    Regular rhythm and normal rate, normal S1 and S2   Abdomen:      Healing lap sites present, redness and fine bumpy rash surrounding incisions (patient feels related to allergy to the skin glue)   Genitalia:    Deferred   Extremities:   Moves all extremities well, no edema, no cyanosis, no redness   Pulses:   Pulses palpable and equal bilaterally   Skin:   No bleeding, bruising or rash   Neurologic:   Cranial nerves 2 - 12 grossly intact       Results from last 7 days   Lab Units 09/11/20  0854   POTASSIUM mmol/L 4.1     Results for GEORGETTE GALLO (MRN [id number]) as of 9/11/2020 13:08   Ref. Range 8/27/2020 15:45   Glucose Latest Ref Range: 65 - 99 mg/dL " 87   Sodium Latest Ref Range: 136 - 145 mmol/L 140   Potassium Latest Ref Range: 3.5 - 5.2 mmol/L 4.3   CO2 Latest Ref Range: 22.0 - 29.0 mmol/L 28.0   Chloride Latest Ref Range: 98 - 107 mmol/L 105   Anion Gap Latest Ref Range: 5.0 - 15.0 mmol/L 7.0   Creatinine Latest Ref Range: 0.57 - 1.00 mg/dL 0.90   BUN Latest Ref Range: 6 - 20 mg/dL 12   BUN/Creatinine Ratio Latest Ref Range: 7.0 - 25.0  13.3   Calcium Latest Ref Range: 8.6 - 10.5 mg/dL 8.9   eGFR Non  Am Latest Ref Range: >60 mL/min/1.73 68   Alkaline Phosphatase Latest Ref Range: 39 - 117 U/L 61   Total Protein Latest Ref Range: 6.0 - 8.5 g/dL 6.2   ALT (SGPT) Latest Ref Range: 1 - 33 U/L 14   AST (SGOT) Latest Ref Range: 1 - 32 U/L 15   Total Bilirubin Latest Ref Range: 0.0 - 1.2 mg/dL 0.2   Albumin Latest Ref Range: 3.50 - 5.20 g/dL 4.40   Globulin Latest Units: gm/dL 1.8   A/G Ratio Latest Units: g/dL 2.4   WBC Latest Ref Range: 3.40 - 10.80 10*3/mm3 5.15   RBC Latest Ref Range: 3.77 - 5.28 10*6/mm3 4.00   Hemoglobin Latest Ref Range: 12.0 - 15.9 g/dL 12.6   Hematocrit Latest Ref Range: 34.0 - 46.6 % 37.7   RDW Latest Ref Range: 12.3 - 15.4 % 12.4   MCV Latest Ref Range: 79.0 - 97.0 fL 94.3   MCH Latest Ref Range: 26.6 - 33.0 pg 31.5   MCHC Latest Ref Range: 31.5 - 35.7 g/dL 33.4   MPV Latest Ref Range: 6.0 - 12.0 fL 9.7   Platelets Latest Ref Range: 140 - 450 10*3/mm3 291       Assessment and Plan:     Right ureteral injury    Hx of hysterectomy 8/31/20      Plan    ER admission for right distal ureteral injury.  She had unsuccessful attempt by Dr. Bryan Price for ureteral stent placement this morning.  She will undergo nephrostomy tube placement by interventional radiology later today.  It is likely early next week she will return for ureteral reimplantation.    1. Ambulation  2. Pain control-prns   3. IS-encourage  4. DVT proph- Mechanical/Lovenox after procedure  5. Bowel regimen  6. Resume home medications per Dr. Bryan Price  7.  Monitor post-op labs  8. DC planning for home, likely tomorrow  9. NPO. Regular diet post procedure   IVF per Dr. Bryan Price, monitor volume status.      KANDIS Lema  09/11/20  13:06

## 2020-09-11 NOTE — ED PROVIDER NOTES
Subjective   Patient is a very pleasant 44-year-old female who presents with abdominal and flank pain.  She states that she had a hysterectomy performed by Dr. Buck on 8/31/2020.  Her postop course was fairly unremarkable.  Yesterday evening at 8 PM, approximately 8 hours ago, she developed she developed nausea and shortly thereafter right flank pain.  She states the pain was initially severe and sharp consistent with kidney stone she is experienced in the past.  [facility] was [facility] which did show a stone and hydro-nephrosis but also some beading of the ureter distal to the stone and on the delayed images showed contrast extravasation into the pelvis concerning for ureteral injury.  The case was discussed with  who accepted the patient to [facility].  She states that her pain is currently improved and she complains of mild nausea for which she does not desire any medication at this time.  Patient denies fever, chills, chest pain, shortness of breath.  She denies any loss of taste or smell or known coronavirus exposures.    Labs and imaging from outside facility reviewed.  The CT scan is consistent with the notes above with concern for ureteral injury.  Laboratory work-up shows mild leukocytosis approximately 13,000, elevated creatinine 1.3 along with GFR of 44.  Specific gravity was also elevated in the patient's urine.  Outside hospital labs and imaging are included in an envelope that is with the patient.      Abdominal Pain   Pain location:  R flank and RLQ  Pain quality: sharp    Pain radiates to:  Does not radiate  Pain severity now: Initially severe, now mild.  Onset quality:  Sudden  Timing:  Intermittent  Progression:  Waxing and waning  Chronicity:  New  Relieved by:  Nothing  Worsened by:  Nothing  Ineffective treatments:  None tried  Associated symptoms: nausea    Associated symptoms: no chest pain, no cough, no fever and no shortness of breath         Review of Systems   Constitutional: Negative for fever.   Respiratory: Negative for cough and shortness of breath.    Cardiovascular: Negative for chest pain.   Gastrointestinal: Positive for abdominal pain and nausea.       Past Medical History:   Diagnosis Date   • Dizzy    • Edema     PRN MAXIDE    • Fibroid    • GERD (gastroesophageal reflux disease)    • History of kidney stones     X2- ONCE PASSED AND ONE HAD SURGERY    • HPV (human papilloma virus) infection    • Kidney stone    • Syncope    • Wears glasses     READERS       No Known Allergies    Past Surgical History:   Procedure Laterality Date   • KIDNEY STONE SURGERY      X1    • LAPAROSCOPIC CHOLECYSTECTOMY     • TOTAL LAPAROSCOPIC HYSTERECTOMY VAGINAL VAULT SUSPENSION Bilateral 8/31/2020    Procedure: TOTAL LAPAROSCOPIC HYSTERECTOMY WITH VAGINAL VAULT SUSPENSION WITH DAVINCI ROBOT, BILATERAL SALPINGECTOMY;  Surgeon: Karthik Buck MD;  Location: [facility];  Service: [facility];  Laterality: Bilateral;   • WISDOM TOOTH EXTRACTION         Family History   Problem Relation Age of Onset   • Neurofibromatosis Father    • Diabetes Father    • Coronary artery disease Father        Social History     Socioeconomic History   • Marital status:      Spouse name: Not on file   • Number of children: Not on file   • Years of education: Not on file   • Highest education level: Not on file   Tobacco Use   • Smoking status: Never Smoker   • Smokeless tobacco: Never Used   Substance and Sexual Activity   • Alcohol use: Yes     Frequency: Never     Comment: MINOR SOCIAL    • Drug use: Never   • Sexual activity: Defer     Partners: Male     Birth control/protection: Surgical     Comment:  vasectomy           Objective   Physical Exam   Constitutional: She is oriented to person, place, and time. She appears well-developed and well-nourished. No distress.   HENT:   Head: Normocephalic and atraumatic.   Eyes: Pupils are equal, round, and reactive to  light. Conjunctivae and EOM are normal.   Neck: Normal range of motion.   Cardiovascular: Normal rate, regular rhythm and normal heart sounds. Exam reveals no friction rub.   No murmur heard.  Pulmonary/Chest: Effort normal and breath sounds normal. No respiratory distress.   Abdominal: Soft. Bowel sounds are normal. She exhibits no distension and no mass. There is tenderness (Mild, diffuse). There is no rebound.   Musculoskeletal: Normal range of motion.   Neurological: She is alert and oriented to person, place, and time.   Skin: Skin is warm and dry. She is not diaphoretic.   Erythema and scabbing at the locations of the previous incision sites.  Consistent with possible allergic reaction to the tape/contact dermatitis.  There is no extending erythema, warmth, or signs of cellulitis.   Psychiatric: She has a normal mood and affect.   Nursing note and vitals reviewed.      Procedures           ED Course         Outside labs reviewed as noted in the HPI.    I have called Dr. Buck and discussed the results in full [place].  He is contacting Dr. Adams and will come and evaluate the patient.          MDM    Final diagnoses:   Status post hysterectomy   Injury of urethra, initial encounter   Lower abdominal pain            Rian Patel DO  09/13/20 3097

## 2020-09-11 NOTE — OP NOTE
CYSTOSCOPY URETERAL CATHETER/STENT INSERTION  Procedure Note    Elana Orantes  9/11/2020    Pre-op Diagnosis:   Right distal ureteral injury    Post-op Diagnosis:     Right distal ureteral injury    Procedure/CPT® Codes:      Procedure(s):  CYSTOSCOPY, RETROGRADE PYELOGRAM, ATTEMPTED RIGHT STENT PLACEMENT    Surgeon(s):  Holger Adams Jr., MD    Anesthesia: General    Staff:   Circulator: Neena Weeks RN  Radiology Technologist: Dean Howell RT  Scrub Person: Chelsi Morales  Nursing Assistant: Audra Ashby    Estimated Blood Loss: minimal  Urine Voided: * No values recorded between 9/11/2020  7:00 AM and 9/11/2020  7:30 AM *    Specimens:                None      Drains: * No LDAs found *    Findings: Blind ending distal ureter approximately 2 to 2-1/2 cm proximal to the ureterovesical junction.  Inability to pass wire into proximal ureter.  There is extravasation of contrast on retrograde injection however no contrast going into the proximal ureter.    Complications: Inability to place ureteral stent    History: This is a pleasant 44-year-old female who underwent robotic assisted laparoscopic hysterectomy August 31, 2020.  She now presents with CT scan findings consistent with right distal ureteral injury.  She understands the need for cystoscopy, retrograde pyelogram and attempted ureteral stent placement.  She understands it if we are unable to place ureteral stent then we will need to perform percutaneous nephrostomy tube drainage with delayed approach for ureteral reimplantation.  She gives her full consent.    Operative Report: After consent is obtained the patient's brought to the operating suite where she is placed in the supine position.  She is then placed in the dorsolithotomy position padding all pressure points.  After being sterilely prepped and draped in normal fashion a cystoscope inserted in the patient's urethra and bladder atraumatically.  Pan cystoscopy was performed we do  not see any bladder tumors, bladder stones, bladder injury.  Both ureter orifices were orthotopic in nature.  The left ureteral orifice has clear reflux.  Ureteral catheter is obtained and placed into the right ureteral orifice and a retrograde pyelogram was performed.  We see a normal distal ureter up to the level 2-1/2 cm proximal to the ureterovesical junction where there is blind ending ureter.  There is no contrast in the proximal ureter despite injection in a retrograde fashion.  We did attempt to use a cone-tip catheter as well but again saw no contrast in the proximal ureter.  When we use the cone-tip there was a mild amount of extravasation of contrast into the periureteral space.  At this point through our ureteral catheter we attempted to place a wire into the proximal ureter however were unsuccessful with attempts using sensor wire, zip wire, and angled zip wire.  Since we are unable to access the proximal ureter we knew that we would be unable to place double-J ureteral stent.  At this point the bladder is emptied anesthesia reversed and she is taken to the recovery room in stable condition.    Plan will be for percutaneous nephrostomy tube later today and plans for ureteral reimplantation in the near future.    Holger Adams Jr., MD     Date: 9/11/2020  Time: 07:47

## 2020-09-11 NOTE — PERIOPERATIVE NURSING NOTE
Okgraham per Dr. Greer with Anesthesia to proceed to surgery without obtaining a new potassium result.

## 2020-09-11 NOTE — ANESTHESIA POSTPROCEDURE EVALUATION
Patient: Elana Orantes    Procedure Summary     Date:  09/11/20 Room / Location:   [facility] /  [facility]    Anesthesia Start:  0701 Anesthesia Stop:      Procedure:  CYSTOSCOPY URETERAL STENT INSERTION (Right ) Diagnosis:      Surgeon:  Holger Adams Jr., MD Provider:  Jeremie Harkins MD    Anesthesia Type:  general ASA Status:  1 - Emergent          Anesthesia Type: general    Vitals  No vitals data found for the desired time range.          Post Anesthesia Care and Evaluation    Patient location during evaluation: PACU  Patient participation: complete - patient participated  Level of consciousness: awake and alert  Pain score: 0  Pain management: adequate  Airway patency: patent  Anesthetic complications: No anesthetic complications  PONV Status: none  Cardiovascular status: hemodynamically stable and acceptable  Respiratory status: nonlabored ventilation, acceptable and nasal cannula  Hydration status: acceptable

## 2020-09-11 NOTE — ANESTHESIA PREPROCEDURE EVALUATION
Anesthesia Evaluation     Patient summary reviewed and Nursing notes reviewed   NPO Solid Status: > 8 hours  NPO Liquid Status: > 8 hours           Airway   Mallampati: I  TM distance: >3 FB  Neck ROM: full  No difficulty expected  Dental - normal exam     Pulmonary - normal exam   Cardiovascular   Exercise tolerance: good (4-7 METS)    Rhythm: regular  Rate: normal        Neuro/Psych  GI/Hepatic/Renal/Endo      Musculoskeletal     Abdominal    Substance History      OB/GYN          Other                        Anesthesia Plan    ASA 1 - emergent     general     intravenous induction     Anesthetic plan, all risks, benefits, and alternatives have been provided, discussed and informed consent has been obtained with: patient.

## 2020-09-11 NOTE — NURSING NOTE
8.3 Right nephrostomy tube placed by Dr Wells.  Pt tolerated well.  Attempted to call report called to 5B x 2  but was unable to reach nurse.  Left message with unit clerk to call radiology for report.  Pt alert and oriented.

## 2020-09-11 NOTE — ANESTHESIA PROCEDURE NOTES
Airway  Urgency: elective    Date/Time: 9/11/2020 7:05 AM  Airway not difficult    General Information and Staff    Patient location during procedure: OR  CRNA: Hank Forte CRNA    Indications and Patient Condition  Indications for airway management: airway protection    Preoxygenated: yes  Mask difficulty assessment: 1 - vent by mask    Final Airway Details  Final airway type: supraglottic airway      Successful airway: I-gel  Size 4    Number of attempts at approach: 1    Additional Comments  LMA placed without difficulty, ventilation with assist, equal breath sounds and symmetric chest rise and fall

## 2020-09-11 NOTE — PROGRESS NOTES
Subjective   Elana Orantes is a 44 y.o. female.  Who underwent a robotically-assisted total laparoscopic hysterectomy/bilateral salpingectomy/vaginal cuff suspension to uterosacral ligaments on 8/31/2020 at [facility].  Her surgery was uncomplicated.  She was discharged on the day of surgery.  She has done well until last night when she began to develop right-sided abdominal pain which she thought was related to a kidney stone.  She has a history of nephrolithiasis.  Final pathology from her surgery was intramural uterine leiomyomata and uterine adenomyosis.  Her ovaries remain in situ.    History of Present Illness: On the evening of 9/10/2020 she began to develop right upper quadrant discomfort which radiated into the pelvis.  She presented to the [facility] emergency department and had a CT scan which revealed, on delayed images, some contrast in the pelvis from the distal ureter.  I asked that they send the patient to the emergency department at [facility].  She denies fevers, chills, or sweats.  She has had no nausea and vomiting.  Her urine has been clear.  She has had normal bowel function.    The following portions of the patient's history were reviewed and updated as appropriate: allergies, current medications, past family history, past medical history, past social history, past surgical history and problem list.    Review of Systems: She denies cough, fever, shortness of breath, and loss of her sense of taste or smell.  Pulmonary-negative  Cardiovascular-negative  Gastrointestinal-negative  Genitourinary-pelvic discomfort        Objective   Physical Exam   VS- stable  Lungs- Clear to auscultation  C-V- RRR with no murmur, rub or gallop  Abd.- Soft, non-tender with no organomegaly and normal bowel sounds      Assessment/Plan :  1) Post operative robotically-assisted Total laparoscopic hysterectomy, bilateral salpingectomy, vaginal vault suspension to uterosacral  ligaments  2) Defect in distal right ureter consistent with intraoperative injury  Plan: Referral to Dr. Holger Adams Jr. For cystoscopy and placement or retrograde ureteral catheter  I have discussed this patient in detail with Dr. Adams.

## 2020-09-11 NOTE — CONSULTS
Consult    Patient Name: Elana Orantes  Medical Record Number: [id number]  YOB: 1975    Date of consultation: 9/11/2020    Referring Provider: Angelo Buck  Reason for Consultation: Right ureteral injury    Patient Care Team:  Vanessa Asher APRN as PCP - General (Nurse Practitioner)  Karthik Buck MD as Consulting Physician (Gynecology)    Chief complaint   Chief Complaint   Patient presents with   • Post-op Problem       Subjective .     History of present illness:    This is a pleasant 44-year-old female who underwent robotic assisted laparoscopic hysterectomy August 31, 2020.  The procedure was uncomplicated and she was sent home on postoperative day 0.  She presented yesterday to the emergency department thinking she had kidney stones.  He does have a history of nephrolithiasis in the past.  She has some mild flank pain and lower abdominal pain.  CT scan performed at the outside hospital was performed with IV contrast and delayed images.  It did demonstrate some right hydroureteral nephrosis with delayed excretion on that side.  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    Past Medical History:   Diagnosis Date   • Dizzy    • Edema     PRN MAXIDE    • Fibroid    • GERD (gastroesophageal reflux disease)    • History of kidney stones     X2- ONCE PASSED AND ONE HAD SURGERY    • HPV (human papilloma virus) infection    • Kidney stone    • Syncope    • Wears glasses     READERS     Past Surgical History:   Procedure Laterality Date   • KIDNEY STONE SURGERY      X1    • LAPAROSCOPIC CHOLECYSTECTOMY     • TOTAL LAPAROSCOPIC HYSTERECTOMY VAGINAL VAULT SUSPENSION Bilateral 8/31/2020    Procedure: TOTAL LAPAROSCOPIC HYSTERECTOMY WITH VAGINAL VAULT SUSPENSION WITH DAVINCI ROBOT,  BILATERAL SALPINGECTOMY;  Surgeon: Karthik Buck MD;  Location: [facility];  Service: [facility];  Laterality: Bilateral;   • WISDOM TOOTH EXTRACTION       Family History   Problem Relation Age of Onset   • Neurofibromatosis Father    • Diabetes Father    • Coronary artery disease Father      Social History     Tobacco Use   • Smoking status: Never Smoker   • Smokeless tobacco: Never Used   Substance Use Topics   • Alcohol use: Yes     Frequency: Never     Comment: MINOR SOCIAL    • Drug use: Never     Medications Prior to Admission   Medication Sig Dispense Refill Last Dose   • docusate sodium (Colace) 100 MG capsule Take 1 capsule by mouth 2 (Two) Times a Day. (Patient taking differently: Take 100 mg by mouth 2 (Two) Times a Day. AFTER SURGERY) 20 capsule 0 Pt not taken yet   • ibuprofen (ADVIL,MOTRIN) 200 MG tablet Take 200 mg by mouth Every 6 (Six) Hours As Needed for Mild Pain .   2020   • [] ibuprofen (ADVIL,MOTRIN) 600 MG tablet Take 1 tablet by mouth Every 6 (Six) Hours As Needed for Mild Pain  for up to 10 days. (Patient taking differently: Take 600 mg by mouth Every 6 (Six) Hours As Needed for Mild Pain . AFTER SURGERY) 40 tablet 0 Pt not taken yet   • Multiple Vitamins-Minerals (MULTIVITAMIN ADULT) tablet Take 1 tablet by mouth Daily.   More than a month at Unknown time   • omeprazole (priLOSEC) 20 MG capsule Take 1 capsule by mouth Daily.   Past Week at Unknown time   • oxyCODONE-acetaminophen (Percocet) 7.5-325 MG per tablet Take 1 tablet by mouth Every 6 (Six) Hours As Needed for Moderate Pain . (Patient taking differently: Take 1 tablet by mouth Every 6 (Six) Hours As Needed for Moderate Pain . AFTER SURGERY) 18 tablet 0 Pt hasn't taken yet   • Probiotic Product (PROBIOTIC-10 PO) Take 1 tablet by mouth Daily.   2020   • triamterene-hydrochlorothiazide (MAXZIDE-25) 37.5-25 MG per tablet Take 1 tablet by mouth As Needed (EDEMA).   Past Month at Unknown time   • zolpidem (Ambien) 5  "MG tablet Take 5 mg by mouth At Night As Needed for Sleep.   8/28/2020     Allergies:  Patient has no known allergies.    Review of Systems  Pertinent items are noted in HPI, all other systems reviewed and negative    Objective     Vital Signs   /81 (BP Location: Right arm, Patient Position: Lying)   Pulse 82   Temp 97.7 °F (36.5 °C) (Temporal)   Resp 18   Ht 170.2 cm (67\")   Wt 72.6 kg (160 lb)   LMP 08/24/2020 (Exact Date)   SpO2 97%   BMI 25.06 kg/m²     Physical Exam:  General Appearance: No apparent distress  Back: No No kyphosis present, no scoliosis present,no tenderness to percussion or Palpation  Lungs: Respirations regular, even and  unlabored  Heart: Regular rhythm and normal rate  Chest Wall: No abnormalities observed  Abdomen: Benign, no costovertebral angle tenderness  Genital: Deferred  Rectal: Deferred  Extremities: Moves all extremities well, no edema no cyanosis, no redness  Pulses: Pulses palpable  Skin: No bleeding, bruising or rash  Lymph nodes: No palpable adenopathy  Neurologic: Neurologically grossly intact    Results Review:  Lab Results (last 24 hours)     Procedure Component Value Units Date/Time    COVID-19, ABBOTT IN-HOUSE,NP Swab (NO TRANSPORT MEDIA) 2 HR TAT - Swab, Nasopharynx [[id number]]  (Normal) Collected:  09/11/20 0601    Specimen:  Swab from Nasopharynx Updated:  09/11/20 0638     COVID19 Not Detected    Narrative:       Fact sheet for providers: https://www.fda.gov/media/496372/download     Fact sheet for patients: https://www.fda.gov/media/948287/download        Imaging Results (Last 72 Hours)     Procedure Component Value Units Date/Time    CT Outside Films [[id number]] Resulted:  09/11/20 0709     Updated:  09/11/20 0709    Narrative:       This procedure was auto-finalized with no dictation required.    FL C Arm During Surgery [[id number]] Resulted:  09/11/20 0623     Updated:  09/11/20 0623           I reviewed the patient's new clinical " results.      Assessment and Recommendations  Right ureteral injury, distal  Urinoma  Abdominal pain    I had a long discussion with the patient this morning regarding the findings of the CT scan imaging.  It does appear she has a distal right ureteral injury following recent hysterectomy.  I recommended cystoscopic evaluation with attempted ureteral stent placement for management.  I discussed the potential that we would not be able to place ureteral stent.  If that was the case we would place percutaneous nephrostomy tube and then plan robotic assisted laparoscopic ureteral reimplantation in the near future.  The patient understood these options and wishes to proceed with cystoscopic attempted ureteral stent placement on the right.  She gives her full consent.      * No active hospital problems. *      I discussed the patients findings and my recommendations with patient and consulting provider    Holger Adams Jr., MD  09/11/20  07:33

## 2020-09-12 VITALS
SYSTOLIC BLOOD PRESSURE: 118 MMHG | DIASTOLIC BLOOD PRESSURE: 76 MMHG | WEIGHT: 160 LBS | TEMPERATURE: 98 F | OXYGEN SATURATION: 97 % | HEART RATE: 73 BPM | HEIGHT: 67 IN | RESPIRATION RATE: 16 BRPM | BODY MASS INDEX: 25.11 KG/M2

## 2020-09-12 LAB
ANION GAP SERPL CALCULATED.3IONS-SCNC: 8 MMOL/L (ref 5–15)
BUN SERPL-MCNC: 12 MG/DL (ref 6–20)
BUN/CREAT SERPL: 14.5 (ref 7–25)
CALCIUM SPEC-SCNC: 8.7 MG/DL (ref 8.6–10.5)
CHLORIDE SERPL-SCNC: 105 MMOL/L (ref 98–107)
CO2 SERPL-SCNC: 25 MMOL/L (ref 22–29)
CREAT SERPL-MCNC: 0.83 MG/DL (ref 0.57–1)
DEPRECATED RDW RBC AUTO: 41.7 FL (ref 37–54)
ERYTHROCYTE [DISTWIDTH] IN BLOOD BY AUTOMATED COUNT: 11.9 % (ref 12.3–15.4)
GFR SERPL CREATININE-BSD FRML MDRD: 75 ML/MIN/1.73
GLUCOSE SERPL-MCNC: 99 MG/DL (ref 65–99)
HCT VFR BLD AUTO: 34 % (ref 34–46.6)
HGB BLD-MCNC: 11 G/DL (ref 12–15.9)
MCH RBC QN AUTO: 30.8 PG (ref 26.6–33)
MCHC RBC AUTO-ENTMCNC: 32.4 G/DL (ref 31.5–35.7)
MCV RBC AUTO: 95.2 FL (ref 79–97)
PLATELET # BLD AUTO: 347 10*3/MM3 (ref 140–450)
PMV BLD AUTO: 8.9 FL (ref 6–12)
POTASSIUM SERPL-SCNC: 4.2 MMOL/L (ref 3.5–5.2)
RBC # BLD AUTO: 3.57 10*6/MM3 (ref 3.77–5.28)
SODIUM SERPL-SCNC: 138 MMOL/L (ref 136–145)
WBC # BLD AUTO: 10.12 10*3/MM3 (ref 3.4–10.8)

## 2020-09-12 PROCEDURE — 80048 BASIC METABOLIC PNL TOTAL CA: CPT | Performed by: UROLOGY

## 2020-09-12 PROCEDURE — 63710000001 DIPHENHYDRAMINE PER 50 MG: Performed by: NURSE PRACTITIONER

## 2020-09-12 PROCEDURE — G0378 HOSPITAL OBSERVATION PER HR: HCPCS

## 2020-09-12 PROCEDURE — 25010000002 ENOXAPARIN PER 10 MG: Performed by: UROLOGY

## 2020-09-12 PROCEDURE — 85027 COMPLETE CBC AUTOMATED: CPT | Performed by: NURSE PRACTITIONER

## 2020-09-12 RX ORDER — HYDROCODONE BITARTRATE AND ACETAMINOPHEN 7.5; 325 MG/1; MG/1
1 TABLET ORAL EVERY 4 HOURS PRN
Qty: 20 TABLET | Refills: 0 | Status: ON HOLD
Start: 2020-09-11 | End: 2020-09-17 | Stop reason: SDUPTHER

## 2020-09-12 RX ORDER — IBUPROFEN 600 MG/1
600 TABLET ORAL EVERY 6 HOURS PRN
Qty: 40 TABLET | Refills: 0 | Status: SHIPPED | OUTPATIENT
Start: 2020-09-12 | End: 2020-09-22

## 2020-09-12 RX ORDER — DOXYCYCLINE 100 MG/1
100 CAPSULE ORAL 2 TIMES DAILY
Qty: 10 CAPSULE | Refills: 0
Start: 2020-09-11 | End: 2020-09-17 | Stop reason: HOSPADM

## 2020-09-12 RX ADMIN — ACETAMINOPHEN 650 MG: 325 TABLET, FILM COATED ORAL at 09:04

## 2020-09-12 RX ADMIN — ACETAMINOPHEN 650 MG: 325 TABLET, FILM COATED ORAL at 04:00

## 2020-09-12 RX ADMIN — DIPHENHYDRAMINE HYDROCHLORIDE 25 MG: 25 CAPSULE ORAL at 09:39

## 2020-09-12 RX ADMIN — OXYCODONE HYDROCHLORIDE 5 MG: 5 TABLET ORAL at 11:24

## 2020-09-12 RX ADMIN — PANTOPRAZOLE SODIUM 40 MG: 40 TABLET, DELAYED RELEASE ORAL at 09:04

## 2020-09-12 RX ADMIN — Medication 5 MG: at 04:07

## 2020-09-12 RX ADMIN — ENOXAPARIN SODIUM 40 MG: 40 INJECTION SUBCUTANEOUS at 09:04

## 2020-09-12 RX ADMIN — GABAPENTIN 100 MG: 100 CAPSULE ORAL at 09:04

## 2020-09-12 NOTE — POST-PROCEDURE NOTE
An immediate patient assessment was done prior to the administration of moderate and/or deep conscious sedation.      Progress Note    Elana Orantes      Pre-op Diagnosis:   44-year-old female who underwent robotic assisted laparoscopic hysterectomy August 31, 2020. 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  Post-op diagnosis:  Same       Anesthesia: Moderate sedation    ASA SCALE ASSESSMENT:  1-Healthy patient, no disease outside surgical process    MALLAMPATI CLASSIFICATION:  2-Able to visualize the soft palate, fauces, uvula. The anterior & posterior tonsilar pillars are hidden by the tongue.    Staff:   Dean Wells MD      Estimated Blood Loss: Trace    Urine Voided: 1300 mL    Specimens:                None      Drains:  8.3 Fr Percutaneous nephrostomy catheter    Findings: Non-dilated right renal collecting system    Complications: No immediate      Dean Wells MD     Date: 9/11/2020  Time: 23:23

## 2020-09-12 NOTE — PROGRESS NOTES
Case Management Discharge Note      Final Note: Plan is home with family. No home care needs are noted.  Family to provide transport.         Selected Continued Care - Admitted Since 9/11/2020     Destination    No services have been selected for the patient.              Durable Medical Equipment    No services have been selected for the patient.              Dialysis/Infusion    No services have been selected for the patient.              Home Medical Care    No services have been selected for the patient.              Therapy    No services have been selected for the patient.              Community Resources    No services have been selected for the patient.                       Final Discharge Disposition Code: 01 - home or self-care

## 2020-09-12 NOTE — DISCHARGE SUMMARY
Patient Name: Elana Orantes  MRN: [id number]  : 1975  DOS: 2020    Attending: Holger Adams Jr*    Primary Care Provider: Vanessa Asher APRN    Date of Admission:.2020  3:49 AM    Date of Discharge:  2020    Discharge Diagnosis:  Status post percutaneous nephrostomy tube placement.     Right ureteral injury    Hx of hysterectomy 20      Hospital Course  At admit  Patient is a pleasant 44 y.o. female presented to [facility] ER with complaints of right flank and low abdominal pain.  She had hysterectomy by Dr. Buck on .  She had been doing fairly well postoperatively, but reports she had felt swollen and bloated in her lower abdominal area.  She reports the pain became severe yesterday evening and she reported to the [facility] emergency room.  She underwent 2 CAT scans there and Dr. Buck was notified.  She was instructed to have her  drive her to the [facility] emergency room.  She denies fevers, chills or night sweats.  She has a history of kidney stones, last 1 about 10 years ago.  She reports one episode of urinary retention last evening, but has been able to void without difficulty since.     Dr. Adams Albert was consulted for right distal ureteral injury noted on CT scans.  The patient underwent cystoscopy, retrograde pyelogram and attempted right stent placement this morning under general anesthesia.  Unfortunately, Dr. Adams was unable to place the ureteral stent.     When seen she is doing fairly well.  She reports some pain from her right rib cage to her low back.  She denies nausea, shortness of breath or chest pain.  No history of DVT or PE.       After admit  She was seen by Dr. Dean Wells with interventional radiology and he placed percutaneous nephrostomy tube, procedure was done on 2020 and was tolerated well.    Patient was provided pain medication as needed for pain control.  She is received DVT prophylaxis  "with subcutaneous Lovenox as well as mechanicals      Patient tolerated p.o. diet without difficulty.    Home medications were resumed as appropriate, and labs were monitored and remained fairly stable.    With the progress she has made, pt is ready for DC home today.      She will return for scheduled surgery by Dr. Adams Jr once a day has been set next week.    Discussed with patient regarding plan and she shows understanding and agreement.       Procedures Performed  Procedure(s):  CYSTOSCOPY, RETROGRADE PYELOGRAM, ATTEMPTED RIGHT STENT PLACEMENT       Pertinent Test Results:    I reviewed the patient's new clinical results.   Results from last 7 days   Lab Units 20  0735   WBC 10*3/mm3 10.12   HEMOGLOBIN g/dL 11.0*   HEMATOCRIT % 34.0   PLATELETS 10*3/mm3 347     Results from last 7 days   Lab Units 20  0734 20  0854   SODIUM mmol/L 138  --    POTASSIUM mmol/L 4.2 4.1   CHLORIDE mmol/L 105  --    CO2 mmol/L 25.0  --    BUN mg/dL 12  --    CREATININE mg/dL 0.83  --    CALCIUM mg/dL 8.7  --    GLUCOSE mg/dL 99  --      I reviewed the patient's new imaging including images and reports.        Discharge Assessment:      Visit Vitals  /71   Pulse 76   Temp 97.9 °F (36.6 °C) (Oral)   Resp 18   Ht 170.2 cm (67\")   Wt 72.6 kg (160 lb)   LMP 2020 (Exact Date)   SpO2 97%   BMI 25.06 kg/m²     Temp (24hrs), Av.9 °F (36.6 °C), Min:97.7 °F (36.5 °C), Max:98 °F (36.7 °C)      General Appearance:    Alert, cooperative, in no acute distress   Lungs:     Clear to auscultation,respirations regular, even and                   unlabored    Heart:    Regular rhythm and normal rate, normal S1 and S2    Abdomen:   Soft and benign with clean incisions  Percutaneous nephrostomy tube on the right with clear urine collection bag.   Extremities:   Moves all extremities well, no edema, no cyanosis, no              redness   Pulses:   Pulses palpable and equal bilaterally   Skin:   No bleeding, bruising " or rash            Discharge Disposition: Home          Discharge Medications      New Medications      Instructions Start Date   doxycycline 100 MG capsule  Commonly known as: MONODOX   100 mg, Oral, 2 Times Daily      HYDROcodone-acetaminophen 7.5-325 MG per tablet  Commonly known as: Norco   1 tablet, Oral, Every 6 Hours PRN         Changes to Medications      Instructions Start Date   docusate sodium 100 MG capsule  Commonly known as: Colace  What changed: additional instructions   100 mg, Oral, 2 Times Daily      ibuprofen 600 MG tablet  Commonly known as: ADVRYAN DONNELLYRIN  What changed:   · additional instructions  · Another medication with the same name was removed. Continue taking this medication, and follow the directions you see here.   600 mg, Oral, Every 6 Hours PRN         Continue These Medications      Instructions Start Date   Ambien 5 MG tablet  Generic drug: zolpidem   5 mg, Oral, Nightly PRN      omeprazole 20 MG capsule  Commonly known as: priLOSEC   1 capsule, Oral, Daily      PROBIOTIC-10 PO   1 tablet, Oral, Daily      triamterene-hydrochlorothiazide 37.5-25 MG per tablet  Commonly known as: MAXZIDE-25   1 tablet, Oral, As Needed         Stop These Medications    Multivitamin Adult tablet     oxyCODONE-acetaminophen 7.5-325 MG per tablet  Commonly known as: Percocet            Discharge Diet: Regular    Activity at Discharge: Ambulate    Follow-up Appointments  Per Dr. Bryan Sotelo MD  09/12/20  11:10 EDT

## 2020-09-13 NOTE — ED PROVIDER NOTES
Subjective   Patient is a very pleasant 44-year-old female who presents with abdominal and flank pain.  She states that she had a hysterectomy performed by Dr. Buck on 8/31/2020.  Her postop course was fairly unremarkable.  Yesterday evening at 8 PM, approximately 8 hours ago, she developed she developed nausea and shortly thereafter right flank pain.  She states the pain was initially severe and sharp consistent with kidney stone she is experienced in the past.  She presented to [facility] where a CT scan was done which did show a stone and hydro-nephrosis but also some beading of the ureter distal to the stone and on the delayed images showed contrast extravasation into the pelvis concerning for ureteral injury.  The case was discussed with  who accepted the patient to [facility].  She states that her pain is currently improved and she complains of mild nausea for which she does not desire any medication at this time.  Patient denies fever, chills, chest pain, shortness of breath.  She denies any loss of taste or smell or known coronavirus exposures.    Labs and imaging from outside facility reviewed.  The CT scan is consistent with the notes above with concern for ureteral injury.  Laboratory work-up shows mild leukocytosis approximately 13,000, elevated creatinine 1.3 along with GFR of 44.  Specific gravity was also elevated in the patient's urine.  Outside hospital labs and imaging are included in an envelope that is with the patient.      Abdominal Pain   Pain location:  R flank and RLQ  Pain quality: sharp    Pain radiates to:  Does not radiate  Pain severity now: Initially severe, now mild.  Onset quality:  Sudden  Timing:  Intermittent  Progression:  Waxing and waning  Chronicity:  New  Relieved by:  Nothing  Worsened by:  Nothing  Ineffective treatments:  None tried  Associated symptoms: nausea    Associated symptoms: no chest pain, no cough, no fever and no shortness of breath         Review of Systems   Constitutional: Negative for fever.   Respiratory: Negative for cough and shortness of breath.    Cardiovascular: Negative for chest pain.   Gastrointestinal: Positive for abdominal pain and nausea.       Past Medical History:   Diagnosis Date   • Dizzy    • Edema     PRN MAXIDE    • Fibroid    • GERD (gastroesophageal reflux disease)    • History of kidney stones     X2- ONCE PASSED AND ONE HAD SURGERY    • HPV (human papilloma virus) infection    • Kidney stone    • Syncope    • Wears glasses     READERS       No Known Allergies    Past Surgical History:   Procedure Laterality Date   • KIDNEY STONE SURGERY      X1    • LAPAROSCOPIC CHOLECYSTECTOMY     • TOTAL LAPAROSCOPIC HYSTERECTOMY VAGINAL VAULT SUSPENSION Bilateral 8/31/2020    Procedure: TOTAL LAPAROSCOPIC HYSTERECTOMY WITH VAGINAL VAULT SUSPENSION WITH DAVINCI ROBOT, BILATERAL SALPINGECTOMY;  Surgeon: Karthik Buck MD;  Location: [facility];  Service: [facility];  Laterality: Bilateral;   • WISDOM TOOTH EXTRACTION         Family History   Problem Relation Age of Onset   • Neurofibromatosis Father    • Diabetes Father    • Coronary artery disease Father        Social History     Socioeconomic History   • Marital status:      Spouse name: Not on file   • Number of children: Not on file   • Years of education: Not on file   • Highest education level: Not on file   Tobacco Use   • Smoking status: Never Smoker   • Smokeless tobacco: Never Used   Substance and Sexual Activity   • Alcohol use: Yes     Frequency: Never     Comment: MINOR SOCIAL    • Drug use: Never   • Sexual activity: Defer     Partners: Male     Birth control/protection: Surgical     Comment:  vasectomy           Objective   Physical Exam   Constitutional: She is oriented to person, place, and time. She appears well-developed and well-nourished. No distress.   HENT:   Head: Normocephalic and atraumatic.   Eyes: Pupils are equal, round, and reactive to  light. Conjunctivae and EOM are normal.   Neck: Normal range of motion.   Cardiovascular: Normal rate, regular rhythm and normal heart sounds. Exam reveals no friction rub.   No murmur heard.  Pulmonary/Chest: Effort normal and breath sounds normal. No respiratory distress.   Abdominal: Soft. Bowel sounds are normal. She exhibits no distension and no mass. There is tenderness (Mild, diffuse). There is no rebound.   Musculoskeletal: Normal range of motion.   Neurological: She is alert and oriented to person, place, and time.   Skin: Skin is warm and dry. She is not diaphoretic.   Erythema and scabbing at the locations of the previous incision sites.  Consistent with possible allergic reaction to the tape/contact dermatitis.  There is no extending erythema, warmth, or signs of cellulitis.   Psychiatric: She has a normal mood and affect.   Nursing note and vitals reviewed.      Procedures           ED Course         Outside labs reviewed as noted in the HPI.    I have called Dr. Buck and discussed the results in full [place].  He is contacting Dr. Adams and will come and evaluate the patient.          [facility]    Final diagnoses:   None

## 2020-09-14 ENCOUNTER — ANESTHESIA EVENT (OUTPATIENT)
Dept: PERIOP | Facility: HOSPITAL | Age: 45
End: 2020-09-14

## 2020-09-14 RX ORDER — FAMOTIDINE 10 MG/ML
20 INJECTION, SOLUTION INTRAVENOUS ONCE
Status: CANCELLED | OUTPATIENT
Start: 2020-09-14 | End: 2020-09-14

## 2020-09-15 ENCOUNTER — HOSPITAL ENCOUNTER (OUTPATIENT)
Facility: HOSPITAL | Age: 45
Discharge: HOME | End: 2020-09-17
Attending: UROLOGY | Admitting: UROLOGY

## 2020-09-15 ENCOUNTER — ANESTHESIA (OUTPATIENT)
Dept: PERIOP | Facility: HOSPITAL | Age: 45
End: 2020-09-15

## 2020-09-15 LAB — SARS-COV-2 RDRP RESP QL NAA+PROBE: NOT DETECTED

## 2020-09-15 PROCEDURE — 25810000003 SODIUM CHLORIDE 0.9 % WITH KCL 20 MEQ 20-0.9 MEQ/L-% SOLUTION: Performed by: UROLOGY

## 2020-09-15 PROCEDURE — C9803 HOPD COVID-19 SPEC COLLECT: HCPCS

## 2020-09-15 PROCEDURE — C2617 STENT, NON-COR, TEM W/O DEL: HCPCS | Performed by: UROLOGY

## 2020-09-15 PROCEDURE — 87635 SARS-COV-2 COVID-19 AMP PRB: CPT | Performed by: UROLOGY

## 2020-09-15 PROCEDURE — 25010000002 DEXAMETHASONE PER 1 MG: Performed by: NURSE ANESTHETIST, CERTIFIED REGISTERED

## 2020-09-15 PROCEDURE — 25010000003 LIDOCAINE 1 % SOLUTION: Performed by: NURSE ANESTHETIST, CERTIFIED REGISTERED

## 2020-09-15 PROCEDURE — 25010000002 DEXAMETHASONE SODIUM PHOSPHATE 10 MG/ML SOLUTION: Performed by: NURSE ANESTHETIST, CERTIFIED REGISTERED

## 2020-09-15 PROCEDURE — 25010000002 BUPRENORPHINE PER 0.1 MG: Performed by: NURSE ANESTHETIST, CERTIFIED REGISTERED

## 2020-09-15 PROCEDURE — 25010000002 FENTANYL CITRATE (PF) 100 MCG/2ML SOLUTION: Performed by: NURSE ANESTHETIST, CERTIFIED REGISTERED

## 2020-09-15 PROCEDURE — 25010000002 ONDANSETRON PER 1 MG: Performed by: NURSE ANESTHETIST, CERTIFIED REGISTERED

## 2020-09-15 PROCEDURE — 25010000002 PROPOFOL 10 MG/ML EMULSION: Performed by: NURSE ANESTHETIST, CERTIFIED REGISTERED

## 2020-09-15 PROCEDURE — 50949 UNLISTED LAPS PX URETER: CPT | Performed by: PHYSICIAN ASSISTANT

## 2020-09-15 PROCEDURE — 25010000002 CEFOXITIN PER 1 G: Performed by: UROLOGY

## 2020-09-15 PROCEDURE — C1769 GUIDE WIRE: HCPCS | Performed by: UROLOGY

## 2020-09-15 PROCEDURE — 25010000002 HYDROMORPHONE PER 4 MG: Performed by: UROLOGY

## 2020-09-15 DEVICE — URETERAL STENT
Type: IMPLANTABLE DEVICE | Status: FUNCTIONAL
Brand: PERCUFLEX™ PLUS

## 2020-09-15 RX ORDER — PANTOPRAZOLE SODIUM 40 MG/1
40 TABLET, DELAYED RELEASE ORAL
Status: DISCONTINUED | OUTPATIENT
Start: 2020-09-16 | End: 2020-09-17 | Stop reason: HOSPADM

## 2020-09-15 RX ORDER — SODIUM CHLORIDE AND POTASSIUM CHLORIDE 150; 900 MG/100ML; MG/100ML
100 INJECTION, SOLUTION INTRAVENOUS CONTINUOUS
Status: DISCONTINUED | OUTPATIENT
Start: 2020-09-15 | End: 2020-09-17 | Stop reason: HOSPADM

## 2020-09-15 RX ORDER — DOCUSATE SODIUM 100 MG/1
100 CAPSULE, LIQUID FILLED ORAL 2 TIMES DAILY PRN
Status: DISCONTINUED | OUTPATIENT
Start: 2020-09-15 | End: 2020-09-17 | Stop reason: HOSPADM

## 2020-09-15 RX ORDER — NALOXONE HCL 0.4 MG/ML
0.1 VIAL (ML) INJECTION
Status: DISCONTINUED | OUTPATIENT
Start: 2020-09-15 | End: 2020-09-17 | Stop reason: HOSPADM

## 2020-09-15 RX ORDER — TRIAMTERENE AND HYDROCHLOROTHIAZIDE 37.5; 25 MG/1; MG/1
1 TABLET ORAL DAILY PRN
Status: DISCONTINUED | OUTPATIENT
Start: 2020-09-15 | End: 2020-09-17 | Stop reason: HOSPADM

## 2020-09-15 RX ORDER — BUPIVACAINE HYDROCHLORIDE 2.5 MG/ML
INJECTION, SOLUTION EPIDURAL; INFILTRATION; INTRACAUDAL
Status: COMPLETED | OUTPATIENT
Start: 2020-09-15 | End: 2020-09-15

## 2020-09-15 RX ORDER — FENTANYL CITRATE 50 UG/ML
50 INJECTION, SOLUTION INTRAMUSCULAR; INTRAVENOUS
Status: DISCONTINUED | OUTPATIENT
Start: 2020-09-15 | End: 2020-09-15 | Stop reason: HOSPADM

## 2020-09-15 RX ORDER — SODIUM CHLORIDE, SODIUM LACTATE, POTASSIUM CHLORIDE, CALCIUM CHLORIDE 600; 310; 30; 20 MG/100ML; MG/100ML; MG/100ML; MG/100ML
9 INJECTION, SOLUTION INTRAVENOUS CONTINUOUS
Status: DISCONTINUED | OUTPATIENT
Start: 2020-09-15 | End: 2020-09-17 | Stop reason: HOSPADM

## 2020-09-15 RX ORDER — LABETALOL HYDROCHLORIDE 5 MG/ML
10 INJECTION, SOLUTION INTRAVENOUS EVERY 4 HOURS PRN
Status: DISCONTINUED | OUTPATIENT
Start: 2020-09-15 | End: 2020-09-17 | Stop reason: HOSPADM

## 2020-09-15 RX ORDER — ZOLPIDEM TARTRATE 5 MG/1
5 TABLET ORAL NIGHTLY PRN
Status: DISCONTINUED | OUTPATIENT
Start: 2020-09-15 | End: 2020-09-17 | Stop reason: HOSPADM

## 2020-09-15 RX ORDER — LIDOCAINE HYDROCHLORIDE 10 MG/ML
INJECTION, SOLUTION INFILTRATION; PERINEURAL AS NEEDED
Status: DISCONTINUED | OUTPATIENT
Start: 2020-09-15 | End: 2020-09-15 | Stop reason: SURG

## 2020-09-15 RX ORDER — SODIUM CHLORIDE 0.9 % (FLUSH) 0.9 %
10 SYRINGE (ML) INJECTION AS NEEDED
Status: DISCONTINUED | OUTPATIENT
Start: 2020-09-15 | End: 2020-09-15 | Stop reason: HOSPADM

## 2020-09-15 RX ORDER — SCOLOPAMINE TRANSDERMAL SYSTEM 1 MG/1
1 PATCH, EXTENDED RELEASE TRANSDERMAL
Status: DISCONTINUED | OUTPATIENT
Start: 2020-09-15 | End: 2020-09-15 | Stop reason: HOSPADM

## 2020-09-15 RX ORDER — DEXAMETHASONE SODIUM PHOSPHATE 10 MG/ML
INJECTION, SOLUTION INTRAMUSCULAR; INTRAVENOUS
Status: COMPLETED | OUTPATIENT
Start: 2020-09-15 | End: 2020-09-15

## 2020-09-15 RX ORDER — OXYCODONE HYDROCHLORIDE 5 MG/1
5 TABLET ORAL EVERY 4 HOURS PRN
Status: DISCONTINUED | OUTPATIENT
Start: 2020-09-15 | End: 2020-09-17 | Stop reason: HOSPADM

## 2020-09-15 RX ORDER — FAMOTIDINE 20 MG/1
20 TABLET, FILM COATED ORAL ONCE
Status: COMPLETED | OUTPATIENT
Start: 2020-09-15 | End: 2020-09-15

## 2020-09-15 RX ORDER — HYDROMORPHONE HCL 110MG/55ML
0.5 PATIENT CONTROLLED ANALGESIA SYRINGE INTRAVENOUS
Status: DISCONTINUED | OUTPATIENT
Start: 2020-09-15 | End: 2020-09-17 | Stop reason: HOSPADM

## 2020-09-15 RX ORDER — ONDANSETRON 2 MG/ML
4 INJECTION INTRAMUSCULAR; INTRAVENOUS EVERY 6 HOURS PRN
Status: DISCONTINUED | OUTPATIENT
Start: 2020-09-15 | End: 2020-09-15

## 2020-09-15 RX ORDER — ONDANSETRON 2 MG/ML
4 INJECTION INTRAMUSCULAR; INTRAVENOUS ONCE AS NEEDED
Status: DISCONTINUED | OUTPATIENT
Start: 2020-09-15 | End: 2020-09-15 | Stop reason: HOSPADM

## 2020-09-15 RX ORDER — ONDANSETRON 4 MG/1
4 TABLET, FILM COATED ORAL EVERY 6 HOURS PRN
Status: DISCONTINUED | OUTPATIENT
Start: 2020-09-15 | End: 2020-09-17 | Stop reason: HOSPADM

## 2020-09-15 RX ORDER — DEXAMETHASONE SODIUM PHOSPHATE 4 MG/ML
INJECTION, SOLUTION INTRA-ARTICULAR; INTRALESIONAL; INTRAMUSCULAR; INTRAVENOUS; SOFT TISSUE AS NEEDED
Status: DISCONTINUED | OUTPATIENT
Start: 2020-09-15 | End: 2020-09-15 | Stop reason: SURG

## 2020-09-15 RX ORDER — ACETAMINOPHEN 650 MG/1
650 SUPPOSITORY RECTAL EVERY 6 HOURS
Status: DISCONTINUED | OUTPATIENT
Start: 2020-09-15 | End: 2020-09-17 | Stop reason: HOSPADM

## 2020-09-15 RX ORDER — PROPOFOL 10 MG/ML
VIAL (ML) INTRAVENOUS AS NEEDED
Status: DISCONTINUED | OUTPATIENT
Start: 2020-09-15 | End: 2020-09-15 | Stop reason: SURG

## 2020-09-15 RX ORDER — ACETAMINOPHEN 160 MG/5ML
650 SOLUTION ORAL EVERY 6 HOURS
Status: DISCONTINUED | OUTPATIENT
Start: 2020-09-15 | End: 2020-09-17 | Stop reason: HOSPADM

## 2020-09-15 RX ORDER — SODIUM CHLORIDE 0.9 % (FLUSH) 0.9 %
10 SYRINGE (ML) INJECTION EVERY 12 HOURS SCHEDULED
Status: DISCONTINUED | OUTPATIENT
Start: 2020-09-15 | End: 2020-09-15 | Stop reason: HOSPADM

## 2020-09-15 RX ORDER — ROCURONIUM BROMIDE 10 MG/ML
INJECTION, SOLUTION INTRAVENOUS AS NEEDED
Status: DISCONTINUED | OUTPATIENT
Start: 2020-09-15 | End: 2020-09-15 | Stop reason: SURG

## 2020-09-15 RX ORDER — ACETAMINOPHEN 325 MG/1
650 TABLET ORAL EVERY 6 HOURS
Status: DISCONTINUED | OUTPATIENT
Start: 2020-09-15 | End: 2020-09-17 | Stop reason: HOSPADM

## 2020-09-15 RX ORDER — PROPOFOL 10 MG/ML
VIAL (ML) INTRAVENOUS CONTINUOUS PRN
Status: DISCONTINUED | OUTPATIENT
Start: 2020-09-15 | End: 2020-09-15 | Stop reason: SURG

## 2020-09-15 RX ORDER — SODIUM CHLORIDE, SODIUM LACTATE, POTASSIUM CHLORIDE, CALCIUM CHLORIDE 600; 310; 30; 20 MG/100ML; MG/100ML; MG/100ML; MG/100ML
INJECTION, SOLUTION INTRAVENOUS CONTINUOUS PRN
Status: DISCONTINUED | OUTPATIENT
Start: 2020-09-15 | End: 2020-09-15 | Stop reason: SURG

## 2020-09-15 RX ORDER — ONDANSETRON 2 MG/ML
4 INJECTION INTRAMUSCULAR; INTRAVENOUS EVERY 6 HOURS PRN
Status: DISCONTINUED | OUTPATIENT
Start: 2020-09-15 | End: 2020-09-17 | Stop reason: HOSPADM

## 2020-09-15 RX ORDER — FENTANYL CITRATE 50 UG/ML
INJECTION, SOLUTION INTRAMUSCULAR; INTRAVENOUS AS NEEDED
Status: DISCONTINUED | OUTPATIENT
Start: 2020-09-15 | End: 2020-09-15 | Stop reason: SURG

## 2020-09-15 RX ORDER — GABAPENTIN 100 MG/1
100 CAPSULE ORAL 3 TIMES DAILY
Status: DISCONTINUED | OUTPATIENT
Start: 2020-09-15 | End: 2020-09-17 | Stop reason: HOSPADM

## 2020-09-15 RX ORDER — MAGNESIUM HYDROXIDE 1200 MG/15ML
LIQUID ORAL AS NEEDED
Status: DISCONTINUED | OUTPATIENT
Start: 2020-09-15 | End: 2020-09-15 | Stop reason: HOSPADM

## 2020-09-15 RX ORDER — LIDOCAINE HYDROCHLORIDE 10 MG/ML
0.5 INJECTION, SOLUTION EPIDURAL; INFILTRATION; INTRACAUDAL; PERINEURAL ONCE AS NEEDED
Status: COMPLETED | OUTPATIENT
Start: 2020-09-15 | End: 2020-09-15

## 2020-09-15 RX ORDER — ONDANSETRON 2 MG/ML
INJECTION INTRAMUSCULAR; INTRAVENOUS AS NEEDED
Status: DISCONTINUED | OUTPATIENT
Start: 2020-09-15 | End: 2020-09-15 | Stop reason: SURG

## 2020-09-15 RX ORDER — BUPRENORPHINE HYDROCHLORIDE 0.32 MG/ML
INJECTION INTRAMUSCULAR; INTRAVENOUS
Status: COMPLETED | OUTPATIENT
Start: 2020-09-15 | End: 2020-09-15

## 2020-09-15 RX ADMIN — LIDOCAINE HYDROCHLORIDE 50 MG: 10 INJECTION, SOLUTION INFILTRATION; PERINEURAL at 16:51

## 2020-09-15 RX ADMIN — OXYCODONE 5 MG: 5 TABLET ORAL at 23:49

## 2020-09-15 RX ADMIN — BUPIVACAINE HYDROCHLORIDE 60 ML: 2.5 INJECTION, SOLUTION EPIDURAL; INFILTRATION; INTRACAUDAL; PERINEURAL at 16:53

## 2020-09-15 RX ADMIN — SCOPALAMINE 1 PATCH: 1 PATCH, EXTENDED RELEASE TRANSDERMAL at 16:08

## 2020-09-15 RX ADMIN — FENTANYL CITRATE 50 MCG: 0.05 INJECTION, SOLUTION INTRAMUSCULAR; INTRAVENOUS at 19:10

## 2020-09-15 RX ADMIN — PROPOFOL 150 MG: 10 INJECTION, EMULSION INTRAVENOUS at 16:51

## 2020-09-15 RX ADMIN — BUPRENORPHINE HYDROCHLORIDE 0.3 MG: 0.32 INJECTION INTRAMUSCULAR; INTRAVENOUS at 16:53

## 2020-09-15 RX ADMIN — DEXAMETHASONE SODIUM PHOSPHATE 4 MG: 10 INJECTION, SOLUTION INTRAMUSCULAR; INTRAVENOUS at 16:53

## 2020-09-15 RX ADMIN — SODIUM CHLORIDE, POTASSIUM CHLORIDE, SODIUM LACTATE AND CALCIUM CHLORIDE 9 ML/HR: 600; 310; 30; 20 INJECTION, SOLUTION INTRAVENOUS at 16:07

## 2020-09-15 RX ADMIN — CEFOXITIN SODIUM 2 G: 1 POWDER, FOR SOLUTION INTRAVENOUS at 16:50

## 2020-09-15 RX ADMIN — FENTANYL CITRATE 50 MCG: 50 INJECTION, SOLUTION INTRAMUSCULAR; INTRAVENOUS at 16:59

## 2020-09-15 RX ADMIN — HYDROMORPHONE HYDROCHLORIDE 0.5 MG: 2 INJECTION, SOLUTION INTRAMUSCULAR; INTRAVENOUS; SUBCUTANEOUS at 21:22

## 2020-09-15 RX ADMIN — ONDANSETRON 4 MG: 2 INJECTION INTRAMUSCULAR; INTRAVENOUS at 17:03

## 2020-09-15 RX ADMIN — ROCURONIUM BROMIDE 10 MG: 10 SOLUTION INTRAVENOUS at 17:35

## 2020-09-15 RX ADMIN — FENTANYL CITRATE 50 MCG: 0.05 INJECTION, SOLUTION INTRAMUSCULAR; INTRAVENOUS at 19:24

## 2020-09-15 RX ADMIN — ACETAMINOPHEN 650 MG: 325 TABLET, FILM COATED ORAL at 21:22

## 2020-09-15 RX ADMIN — PROPOFOL 25 MCG/KG/MIN: 10 INJECTION, EMULSION INTRAVENOUS at 16:58

## 2020-09-15 RX ADMIN — FENTANYL CITRATE 50 MCG: 0.05 INJECTION, SOLUTION INTRAMUSCULAR; INTRAVENOUS at 19:03

## 2020-09-15 RX ADMIN — FAMOTIDINE 20 MG: 20 TABLET, FILM COATED ORAL at 16:08

## 2020-09-15 RX ADMIN — FENTANYL CITRATE 50 MCG: 50 INJECTION, SOLUTION INTRAMUSCULAR; INTRAVENOUS at 16:51

## 2020-09-15 RX ADMIN — POTASSIUM CHLORIDE AND SODIUM CHLORIDE 100 ML/HR: 900; 150 INJECTION, SOLUTION INTRAVENOUS at 18:57

## 2020-09-15 RX ADMIN — GABAPENTIN 100 MG: 100 CAPSULE ORAL at 21:22

## 2020-09-15 RX ADMIN — ROCURONIUM BROMIDE 50 MG: 10 SOLUTION INTRAVENOUS at 16:51

## 2020-09-15 RX ADMIN — DEXAMETHASONE SODIUM PHOSPHATE 8 MG: 4 INJECTION, SOLUTION INTRAMUSCULAR; INTRAVENOUS at 16:58

## 2020-09-15 RX ADMIN — LIDOCAINE HYDROCHLORIDE 0.5 ML: 10 INJECTION, SOLUTION EPIDURAL; INFILTRATION; INTRACAUDAL; PERINEURAL at 16:07

## 2020-09-15 RX ADMIN — SODIUM CHLORIDE, POTASSIUM CHLORIDE, SODIUM LACTATE AND CALCIUM CHLORIDE: 600; 310; 30; 20 INJECTION, SOLUTION INTRAVENOUS at 16:46

## 2020-09-15 RX ADMIN — FENTANYL CITRATE 50 MCG: 0.05 INJECTION, SOLUTION INTRAMUSCULAR; INTRAVENOUS at 18:57

## 2020-09-15 NOTE — ANESTHESIA PROCEDURE NOTES
Airway  Urgency: elective    Date/Time: 9/15/2020 4:53 PM  Airway not difficult    General Information and Staff    Patient location during procedure: OR  Anesthesiologist: Lilia Rashid MD    Indications and Patient Condition  Indications for airway management: airway protection    Preoxygenated: yes  MILS not maintained throughout  Mask difficulty assessment: 1 - vent by mask    Final Airway Details  Final airway type: endotracheal airway      Successful airway: ETT  Cuffed: yes   Successful intubation technique: direct laryngoscopy  Facilitating devices/methods: intubating stylet  Endotracheal tube insertion site: oral  Blade: Fany  Blade size: 3  ETT size (mm): 7.0  Cormack-Lehane Classification: grade I - full view of glottis  Placement verified by: chest auscultation and capnometry   Measured from: lips  ETT/EBT  to lips (cm): 20  Number of attempts at approach: 1  Assessment: lips, teeth, and gum same as pre-op and atraumatic intubation    Additional Comments  Negative epigastric sounds, Breath sound equal bilaterally with symmetric chest rise and fall.  Teeth intact, atraumatic

## 2020-09-15 NOTE — ANESTHESIA POSTPROCEDURE EVALUATION
Patient: Elana Orantes    Procedure Summary     Date: 09/15/20 Room / Location:  [facility]  [facility]    Anesthesia Start: 1646 Anesthesia Stop: 1842    Procedure: ROBOTIC ASSISTED LAPAROSCOPIC RIGHT URETERAL REIMPLANTATION, URETERAL LYSIS RIGHT (Right Abdomen) Diagnosis:     Surgeon: Holger Adams Jr., MD Provider: Cruz Ames MD    Anesthesia Type: general with block ASA Status: 1          Anesthesia Type: general with block    Vitals  Vitals Value Taken Time   /86 09/15/20 1839   Temp     Pulse 76 09/15/20 1842   Resp     SpO2 100 % 09/15/20 1842   Vitals shown include unvalidated device data.        Post Anesthesia Care and Evaluation    Patient location during evaluation: PACU  Patient participation: complete - patient participated  Level of consciousness: awake and alert  Pain score: 0  Pain management: adequate  Airway patency: patent  Anesthetic complications: No anesthetic complications  PONV Status: none  Cardiovascular status: hemodynamically stable and acceptable  Respiratory status: nonlabored ventilation, acceptable and nasal cannula  Hydration status: acceptable

## 2020-09-15 NOTE — ANESTHESIA PREPROCEDURE EVALUATION
Anesthesia Evaluation     Patient summary reviewed and Nursing notes reviewed   no history of anesthetic complications:  NPO Solid Status: > 8 hours  NPO Liquid Status: > 8 hours           Airway   Mallampati: I  TM distance: >3 FB  Neck ROM: full  No difficulty expected  Dental - normal exam     Pulmonary - negative pulmonary ROS and normal exam   Cardiovascular - negative cardio ROS  Exercise tolerance: good (4-7 METS)    Rhythm: regular  Rate: normal        Neuro/Psych  (+) dizziness/light headedness, syncope,     GI/Hepatic/Renal/Endo    (+)  GERD,  renal disease,     Musculoskeletal     Abdominal    Substance History      OB/GYN          Other                        Anesthesia Plan    ASA 1     general with block   (TAPs  Propofol gtt as part of antinausea technique  Scop patch)  intravenous induction     Anesthetic plan, all risks, benefits, and alternatives have been provided, discussed and informed consent has been obtained with: patient.    Plan discussed with CRNA.

## 2020-09-16 ENCOUNTER — TELEPHONE (OUTPATIENT)
Dept: OBSTETRICS AND GYNECOLOGY | Facility: CLINIC | Age: 45
End: 2020-09-16

## 2020-09-16 PROBLEM — S37.10XA: Status: ACTIVE | Noted: 2020-09-16

## 2020-09-16 PROBLEM — N99.81: Status: ACTIVE | Noted: 2020-09-16

## 2020-09-16 LAB
ANION GAP SERPL CALCULATED.3IONS-SCNC: 9 MMOL/L (ref 5–15)
BUN SERPL-MCNC: 11 MG/DL (ref 6–20)
BUN/CREAT SERPL: 13.3 (ref 7–25)
CALCIUM SPEC-SCNC: 8.8 MG/DL (ref 8.6–10.5)
CHLORIDE SERPL-SCNC: 103 MMOL/L (ref 98–107)
CO2 SERPL-SCNC: 25 MMOL/L (ref 22–29)
CREAT SERPL-MCNC: 0.83 MG/DL (ref 0.57–1)
DEPRECATED RDW RBC AUTO: 40 FL (ref 37–54)
ERYTHROCYTE [DISTWIDTH] IN BLOOD BY AUTOMATED COUNT: 11.7 % (ref 12.3–15.4)
GFR SERPL CREATININE-BSD FRML MDRD: 75 ML/MIN/1.73
GLUCOSE SERPL-MCNC: 119 MG/DL (ref 65–99)
HCT VFR BLD AUTO: 34.8 % (ref 34–46.6)
HGB BLD-MCNC: 11.2 G/DL (ref 12–15.9)
MCH RBC QN AUTO: 30 PG (ref 26.6–33)
MCHC RBC AUTO-ENTMCNC: 32.2 G/DL (ref 31.5–35.7)
MCV RBC AUTO: 93.3 FL (ref 79–97)
PLATELET # BLD AUTO: 404 10*3/MM3 (ref 140–450)
PMV BLD AUTO: 9.6 FL (ref 6–12)
POTASSIUM SERPL-SCNC: 4.5 MMOL/L (ref 3.5–5.2)
RBC # BLD AUTO: 3.73 10*6/MM3 (ref 3.77–5.28)
SODIUM SERPL-SCNC: 137 MMOL/L (ref 136–145)
WBC # BLD AUTO: 11.48 10*3/MM3 (ref 3.4–10.8)

## 2020-09-16 PROCEDURE — 80048 BASIC METABOLIC PNL TOTAL CA: CPT | Performed by: NURSE PRACTITIONER

## 2020-09-16 PROCEDURE — 25010000002 CEFOXITIN PER 1 G: Performed by: UROLOGY

## 2020-09-16 PROCEDURE — 25810000003 SODIUM CHLORIDE 0.9 % WITH KCL 20 MEQ 20-0.9 MEQ/L-% SOLUTION: Performed by: UROLOGY

## 2020-09-16 PROCEDURE — 85027 COMPLETE CBC AUTOMATED: CPT | Performed by: NURSE PRACTITIONER

## 2020-09-16 PROCEDURE — 25010000002 ENOXAPARIN PER 10 MG: Performed by: UROLOGY

## 2020-09-16 PROCEDURE — 94799 UNLISTED PULMONARY SVC/PX: CPT

## 2020-09-16 RX ADMIN — CEFOXITIN SODIUM 2 G: 2 POWDER, FOR SOLUTION INTRAVENOUS at 02:06

## 2020-09-16 RX ADMIN — ACETAMINOPHEN ORAL SOLUTION 650 MG: 650 SOLUTION ORAL at 08:55

## 2020-09-16 RX ADMIN — GABAPENTIN 100 MG: 100 CAPSULE ORAL at 16:16

## 2020-09-16 RX ADMIN — OXYCODONE 5 MG: 5 TABLET ORAL at 04:14

## 2020-09-16 RX ADMIN — ACETAMINOPHEN 650 MG: 325 TABLET, FILM COATED ORAL at 02:06

## 2020-09-16 RX ADMIN — OXYCODONE 5 MG: 5 TABLET ORAL at 21:46

## 2020-09-16 RX ADMIN — CEFOXITIN SODIUM 2 G: 2 POWDER, FOR SOLUTION INTRAVENOUS at 08:57

## 2020-09-16 RX ADMIN — OXYCODONE 5 MG: 5 TABLET ORAL at 17:37

## 2020-09-16 RX ADMIN — DOCUSATE SODIUM 100 MG: 100 CAPSULE, LIQUID FILLED ORAL at 08:56

## 2020-09-16 RX ADMIN — ENOXAPARIN SODIUM 40 MG: 40 INJECTION SUBCUTANEOUS at 08:56

## 2020-09-16 RX ADMIN — GABAPENTIN 100 MG: 100 CAPSULE ORAL at 08:56

## 2020-09-16 RX ADMIN — ACETAMINOPHEN 650 MG: 325 TABLET, FILM COATED ORAL at 16:16

## 2020-09-16 RX ADMIN — ZOLPIDEM TARTRATE 5 MG: 5 TABLET ORAL at 21:46

## 2020-09-16 RX ADMIN — POTASSIUM CHLORIDE AND SODIUM CHLORIDE 100 ML/HR: 900; 150 INJECTION, SOLUTION INTRAVENOUS at 13:22

## 2020-09-16 RX ADMIN — OXYCODONE 5 MG: 5 TABLET ORAL at 13:22

## 2020-09-16 RX ADMIN — POTASSIUM CHLORIDE AND SODIUM CHLORIDE 100 ML/HR: 900; 150 INJECTION, SOLUTION INTRAVENOUS at 22:14

## 2020-09-16 RX ADMIN — PANTOPRAZOLE SODIUM 40 MG: 40 TABLET, DELAYED RELEASE ORAL at 06:35

## 2020-09-16 RX ADMIN — OXYCODONE 5 MG: 5 TABLET ORAL at 08:56

## 2020-09-16 RX ADMIN — ACETAMINOPHEN 650 MG: 325 TABLET, FILM COATED ORAL at 21:46

## 2020-09-16 RX ADMIN — GABAPENTIN 100 MG: 100 CAPSULE ORAL at 21:46

## 2020-09-16 NOTE — TELEPHONE ENCOUNTER
I have called the patient in the hospital to inquire about how she is doing?  She is doing well with no significant complaints.  She is ambulating.  She is awaiting an increase in bowel function.  Dr. Adams has indicated that she had an excellent re-implantation of the ureter.  She has a Rucker catheter.  I have asked her to contact me should she have any gynecologic complaints or concerns.  She will see me back in the office the same day that she sees Dr. Bryan Jr.

## 2020-09-17 ENCOUNTER — DOCUMENTATION (OUTPATIENT)
Dept: OBSTETRICS AND GYNECOLOGY | Facility: CLINIC | Age: 45
End: 2020-09-17

## 2020-09-17 VITALS
DIASTOLIC BLOOD PRESSURE: 70 MMHG | HEART RATE: 65 BPM | HEIGHT: 67 IN | WEIGHT: 161.8 LBS | SYSTOLIC BLOOD PRESSURE: 105 MMHG | TEMPERATURE: 98.3 F | RESPIRATION RATE: 18 BRPM | OXYGEN SATURATION: 94 % | BODY MASS INDEX: 25.39 KG/M2

## 2020-09-17 PROCEDURE — 25010000002 ENOXAPARIN PER 10 MG: Performed by: UROLOGY

## 2020-09-17 RX ORDER — POLYETHYLENE GLYCOL 3350 17 G/17G
17 POWDER, FOR SOLUTION ORAL DAILY
Qty: 510 G | Refills: 0
Start: 2020-09-15

## 2020-09-17 RX ORDER — NITROFURANTOIN 25; 75 MG/1; MG/1
100 CAPSULE ORAL 2 TIMES DAILY
Qty: 30 CAPSULE | Refills: 0
Start: 2020-09-15

## 2020-09-17 RX ADMIN — OXYCODONE 5 MG: 5 TABLET ORAL at 08:19

## 2020-09-17 RX ADMIN — OXYCODONE 5 MG: 5 TABLET ORAL at 03:21

## 2020-09-17 RX ADMIN — ENOXAPARIN SODIUM 40 MG: 40 INJECTION SUBCUTANEOUS at 08:20

## 2020-09-17 RX ADMIN — ACETAMINOPHEN 650 MG: 325 TABLET, FILM COATED ORAL at 08:19

## 2020-09-17 RX ADMIN — PANTOPRAZOLE SODIUM 40 MG: 40 TABLET, DELAYED RELEASE ORAL at 06:31

## 2020-09-17 RX ADMIN — OXYCODONE 5 MG: 5 TABLET ORAL at 13:23

## 2020-09-17 RX ADMIN — GABAPENTIN 100 MG: 100 CAPSULE ORAL at 08:19

## 2020-09-17 RX ADMIN — ACETAMINOPHEN 650 MG: 325 TABLET, FILM COATED ORAL at 03:21

## 2020-09-17 NOTE — PROGRESS NOTES
I have seen this patient in the hospital following her robotically-assisted ureteral lysis and right ureteral reimplantation by Dr. Adams.  She is pleased with her outcome.  She is ambulating well.  Her Rucker catheter is functioning well.  Her drains are being removed.  She has once again indicated that she is pleased that this was detected early.  I have asked her to schedule a follow-up appointment with me on the same day that she sees Dr. Adams in his office for catheter removal.  She is having no other problems from her hysterectomy.

## 2020-09-19 ENCOUNTER — NURSE TRIAGE (OUTPATIENT)
Dept: CALL CENTER | Facility: HOSPITAL | Age: 45
End: 2020-09-19

## 2020-09-20 NOTE — TELEPHONE ENCOUNTER
Instructed how to remove tegaderm dressing.    Reason for Disposition  • Skin tape (e.g., Steri-strips) removal, questions about    Additional Information  • Negative: [1] Major abdominal surgical incision AND [2] wound gaping open AND [3] visible internal organs  • Negative: Sounds like a life-threatening emergency to the triager  • Negative: [1] Bleeding from incision AND [2] won't stop after 10 minutes of direct pressure  • Negative: [1] Widespread rash AND [2] bright red, sunburn-like  • Negative: Severe pain in the incision  • Negative: [1] Incision gaping open AND [2] < 48 hours since wound re-opened  • Negative: [1] Incision gaping open AND [2] length of opening > 2 inches (5 cm)  • Negative: Patient sounds very sick or weak to the triager  • Negative: Sounds like a serious complication to the triager  • Negative: Fever > 100.4 F (38.0 C)  • Negative: [1] Incision looks infected (spreading redness, pain) AND [2] fever > 99.5 F (37.5 C)  • Negative: [1] Incision looks infected (spreading redness, pain) AND [2] large red area (> 2 in. or 5 cm)  • Negative: [1] Incision looks infected (spreading redness, pain) AND [2] face wound  • Negative: [1] Red streak runs from the incision AND [2] longer than 1 inch (2.5 cm)  • Negative: [1] Pus or bad-smelling fluid draining from incision AND [2] no fever  • Negative: [1] Post-op pain AND [2] not controlled with pain medications  • Negative: Dressing soaked with blood or body fluid (e.g., drainage)  • Negative: [1] Raised bruise and [2] size > 2 inches (5 cm) and expanding  • Negative: [1] Caller has URGENT question AND [2] triager unable to answer question  • Negative: [1] INCREASING pain in incision AND [2] > 2 days (48 hours) since surgery  • Negative: [1] Small red area or streak AND [2] no fever  • Negative: [1] Clear or blood-tinged fluid draining from wound AND [2] no fever  • Negative: [1] Incision gaping open AND [2] > 48 hours since wound re-opened AND [3]  "length of opening > 1/2 inch (12 mm)  • Negative: [1] Incision on face gaping open after skin glue AND [2] > 48 hours since wound re-opened AND [3] length of opening > 1/4 inch (6 mm)  • Negative: Suture or staple removal is overdue  • Negative: [1] Suture or staple came out early AND [2] caller wants wound checked  • Negative: [1] Caller has NON-URGENT question AND [2] triager unable to answer question  • Negative: Pimple where a stitch comes through the skin  • Negative: Suture (or staple) came out early  • Negative: Mild bruising near incision site  • Negative: Suture removal date, questions about  • Negative: Sutured or stapled surgical incision, questions about    Answer Assessment - Initial Assessment Questions  1. SYMPTOM: \"What's the main symptom you're concerned about?\" (e.g., redness, pain, drainage)      How to remove the clear dressing at the surgical sites  2. ONSET: \"When did symptom  start?\"      today  3. SURGERY: \"What surgery was performed?\"      hysterectomy  4. DATE of SURGERY: \"When was surgery performed?\"       Tuesday  5. INCISION SITE: \"Where is the incision located?\"       abdomen  6. REDNESS: \"Is there any redness at the incision site?\" If yes, ask: \"How wide across is the redness?\" (Inches, centimeters)       na  7. PAIN: \"Is there any pain?\" If so, ask: \"How bad is it?\"  (Scale 1-10; or mild, moderate, severe)      Incisional only  8. BLEEDING: \"Is there any bleeding?\" If so, ask: \"How much?\" and \"Where?\"      na  9. DRAINAGE: \"Is there any drainage from the incision site?\" If yes, ask: \"What color and how much?\" (e.g., red, cloudy, pus; drops, teaspoon)      na  10. FEVER: \"Do you have a fever?\" If so, ask: \"What is your temperature, how was it measured, and when did it start?\"        na  11. OTHER SYMPTOMS: \"Do you have any other symptoms?\" (e.g., shaking chills, weakness, rash elsewhere on body)        na    Protocols used: POST-OP INCISION SYMPTOMS AND QUESTIONS-ADULT-      "

## 2020-09-22 ENCOUNTER — NURSE TRIAGE (OUTPATIENT)
Dept: CALL CENTER | Facility: HOSPITAL | Age: 45
End: 2020-09-22

## 2020-09-22 ENCOUNTER — TELEPHONE (OUTPATIENT)
Dept: OBSTETRICS AND GYNECOLOGY | Facility: CLINIC | Age: 45
End: 2020-09-22

## 2020-09-22 NOTE — TELEPHONE ENCOUNTER
I have called the patient to ascertain how she is doing since her ureteral reimplantation by Dr. Adams.  She indicates that in the last 24 hours she is noted some air in the Rucker bag.  She also notices some leakage at the urethra.  She is ambulating.  Her bowel function is normal.  She has had no vaginal spotting.  She indicates that her incisions are healing well.  I have encouraged the patient to call Dr. Adams's office to indicate her concerns to them.  She will see me on 9/28/2020.

## 2020-09-23 NOTE — TELEPHONE ENCOUNTER
Caller states ROBOTIC ASSISTED LAPAROSCOPIC RIGHT URETERAL surgery on 9/15 and today noticed pain of five, f/c leaking and hissing. She denies lower abdominal area feeling tight and states f/c is draining. Caller states it wasn't hurting like this. Caller states did call office today and missed call back. Caller denies fever. Advised per guideline. Caller aware call back as needed.      Reason for Disposition  • [1] Caller has URGENT question AND [2] triager unable to answer question    Additional Information  • Negative: Sounds like a life-threatening emergency to the triager  • Negative: Chest pain  • Negative: Difficulty breathing  • Negative: Acting confused (e.g., disoriented, slurred speech) or excessively sleepy  • Negative: Surgical incision symptoms and questions  • Negative: [1] Discomfort (pain, burning or stinging) when passing urine AND [2] male  • Negative: [1] Discomfort (pain, burning or stinging) when passing urine AND [2] female  • Negative: Constipation  • Negative: New or worsening leg (calf, thigh) pain  • Negative: New or worsening leg swelling  • Negative: Dizziness is severe, or persists > 24 hours after surgery  • Negative: Pain, redness, swelling, or pus at IV Site  • Negative: Symptoms arising from use of a urinary catheter (Rucker or Coude)  • Negative: Cast problems or questions  • Negative: Medication question  • Negative: [1] Widespread rash AND [2] bright red, sunburn-like  • Negative: [1] SEVERE headache AND [2] after spinal (epidural) anesthesia  • Negative: [1] Vomiting AND [2] persists > 4 hours  • Negative: [1] Vomiting AND [2] abdomen looks much more swollen than usual  • Negative: [1] Drinking very little AND [2] dehydration suspected (e.g., no urine > 12 hours, very dry mouth, very lightheaded)  • Negative: Patient sounds very sick or weak to the triager  • Negative: Sounds like a serious complication to the triager  • Negative: Fever > 100.4 F (38.0 C)  • Negative: [1] SEVERE  "post-op pain (e.g., excruciating, pain scale 8-10) AND [2] not controlled with pain medications    Answer Assessment - Initial Assessment Questions  1. SYMPTOM: \"What's the main symptom you're concerned about?\" (e.g., pain, fever, vomiting)      Pain, F/c leaking and hissing she states   2. ONSET: \"When did symptoms  start?\"       Today  3. SURGERY: \"What surgery was performed?\"      9/15 ROBOTIC ASSISTED LAPAROSCOPIC RIGHT URETERAL   4. DATE of SURGERY: \"When was surgery performed?\"       9/15  5. ANESTHESIA: \" What type of anesthesia did you have?\" (e.g., general, spinal, epidural, local)        6. PAIN: \"Is there any pain?\" If so, ask: \"How bad is it?\"  (Scale 1-10; or mild, moderate, severe)      5  7. FEVER: \"Do you have a fever?\" If so, ask: \"What is your temperature, how was it measured, and when did it start?\"      Denies states on Macrobid  8. VOMITING: \"Is there any vomiting?\" If yes, ask: \"How many times?\"      No c/o   9. BLEEDING: \"Is there any bleeding?\" If so, ask: \"How much?\" and \"Where?\"      No c/o   10. OTHER SYMPTOMS: \"Do you have any other symptoms?\" (e.g., drainage from wound, painful urination, constipation)        No c/o    Protocols used: POST-OP SYMPTOMS AND QUESTIONS-ADULT-      "

## 2020-09-28 ENCOUNTER — OFFICE VISIT (OUTPATIENT)
Dept: OBSTETRICS AND GYNECOLOGY | Facility: CLINIC | Age: 45
End: 2020-09-28

## 2020-09-28 VITALS
WEIGHT: 150 LBS | SYSTOLIC BLOOD PRESSURE: 122 MMHG | DIASTOLIC BLOOD PRESSURE: 90 MMHG | BODY MASS INDEX: 23.54 KG/M2 | HEIGHT: 67 IN

## 2020-09-28 DIAGNOSIS — Z09 POSTOPERATIVE FOLLOW-UP: Primary | ICD-10-CM

## 2020-09-28 PROBLEM — Z90.710 HX OF HYSTERECTOMY: Status: RESOLVED | Noted: 2020-09-11 | Resolved: 2020-09-28

## 2020-09-28 PROCEDURE — 99024 POSTOP FOLLOW-UP VISIT: CPT | Performed by: OBSTETRICS & GYNECOLOGY

## 2020-09-28 NOTE — PROGRESS NOTES
Chief Complaint   Patient presents with   • Post-op     no c/o; 2020 surgery       Elana Orantes is a 44 y.o. year old  presenting to be seen for her post-operative visit following a robotically-assisted total laparoscopic hysterectomy/bilateral salpingectomy/vaginal vault suspension to uterosacral ligaments done on 2020 at [facility].  The patient had intramural uterine leiomyoma and uterine adenomyosis.  She presented postoperatively with complaints of right lower quadrant pain and was found on CT scan to have a lower right ureteral injury.  Apparently, at the time that the vaginal cuff was closed the 2-0 stratafix suture had compromised the medial aspect of the distal ureter and its function.  Dr. Duglas Adams Jr. was consulted and did a right ureteral reimplantation.  The patient saw him today and her Rucker catheter was removed.  The cystogram revealed no leakage.  She has a right ureteral stent in place.  She has had no other postoperative problems.  She has had good urinary output.  She has no bowel symptoms.  Following removal of the Rucker catheter she has had some spotting.    Procedure:  TLH/VCS/Bilateral Salpingectomy    ROS:  A review of systems was negative.  Constitutional: negative for fever, chills, activity change, appetite change, fatigue and unexpected weight change.  Respiratory: negative   Cardiovascular: negative  Gastrointestinal: negative  Genitourinary:positive for ureteral stent, some spotting  Musculoskeletal:negative  Behavioral/Psych: negative      Symptoms:  Fever  No, Chills/Sweat  No, Nausea/Vomiting    No, Normal Bowel Function  Yes, Normal Urinary Function  Rucker removed by Bryan and voiding normally and Abnormal Discharge   No    Spotting after Rucker removed        Physical Exam:  Lungs:  Normal expansion.  Clear to auscultation.  No rales, rhonchi, or wheezing.  Abdomen:  Soft, non-tender, normal bowel sounds; no bruits, organomegaly or  masses.  Incisions are intact and dry, steri strips removed   Pelvic:  Clinical staff was present for exam  External genitalia:  normal appearance of the external genitalia including Bartholin's and Karnak's glands.  Vaginal:  vagina cuff is intact and dry, there is some blood in the vault from urethra  Cervix:  absent.  Uterus:  absent.  Adnexa:  Tubes absent, ovaries not enlarged          Impression: 1) S/P robotically-assisted total laparoscopic hysterectomy/bilateral salpingectomy/vaginal vault suspension to uterosacral ligaments for intramural uterine leiomyomata and uterine adenomyosis, 2) S/P right ureteral reimplantation, ureteral stent in place       PLAN:  1. Follow up: 6 month(s)   2. I have advised the patient to wait 2 more weeks before resuming intercourse.  3. She will follow-up with Dr. Adams in 3 weeks.  *Please note that portions of this documentation may have been completed with a voice recognition program.  Efforts were made to edit this dictation, but occasional words may have been mistranscribed.      This note was electronically signed.    SALUD Buck MD  September 28, 2020  11:30 EDT

## 2020-11-09 ENCOUNTER — TELEPHONE (OUTPATIENT)
Dept: OBSTETRICS AND GYNECOLOGY | Facility: CLINIC | Age: 45
End: 2020-11-09

## 2020-11-09 NOTE — TELEPHONE ENCOUNTER
I attempted to call this patient regarding her urologic follow-up and left a message on her voice messaging machine to contact me.

## 2020-12-02 ENCOUNTER — TELEPHONE (OUTPATIENT)
Dept: OBSTETRICS AND GYNECOLOGY | Facility: CLINIC | Age: 45
End: 2020-12-02

## 2020-12-02 NOTE — TELEPHONE ENCOUNTER
I have called the patient to inform her that her basic metabolic profile reveals a normal BUN and creatinine.  I left a message on her answering machine with these results.

## 2024-05-02 NOTE — ANESTHESIA PROCEDURE NOTES
Bilat Subcostal & right mid-axillary      Patient reassessed immediately prior to procedure    Patient location during procedure: OR  Reason for block: at surgeon's request and post-op pain management  Performed by  CRNA: Dorinda Huang CRNA  Preanesthetic Checklist  Completed: patient identified, site marked, surgical consent, pre-op evaluation, timeout performed, IV checked, risks and benefits discussed and monitors and equipment checked  Prep:  Pt Position: supine  Sterile barriers:cap, gloves, sterile barriers and mask  Prep: ChloraPrep  Patient monitoring: blood pressure monitoring, continuous pulse oximetry and EKG  Procedure  Sedation:yes  Performed under: general  Guidance:ultrasound guided  Images:still images obtained, printed/placed on chart    Laterality:Bilateral  Block Type:TAP  Injection Technique:single-shot  Needle Type:short-bevel and echogenic  Needle Gauge:20 G  Resistance on Injection: none    Medications Used: bupivacaine PF (MARCAINE) 0.25 % injection, 60 mL  dexamethasone sodium phosphate injection, 4 mg  buprenorphine (BUPRENEX) injection, 0.3 mg  Med admintered at 9/15/2020 4:53 PM      Medications  Comment:Block Injection:  20 ml at each sight Bilat subcostal & right mid-axillary        Post Assessment  Injection Assessment: negative aspiration for heme, incremental injection and no paresthesia on injection  Patient Tolerance:comfortable throughout block  Complications:no  Additional Notes      Under Ultrasound guidance, a BBraun 4inch 360 degree needle was advanced with Normal Saline hydro dissection of tissue.  The Internal Oblique and Transversus Abdominus muscles where visualized.  At or before the aponeurosis of Internal Oblique, local anesthetic spread was visualized in the Transversus Abdominus Plane. Injection was made incrementally with aspiration every 5 mls.  There was no  intravascular injection,  injection pressure was normal, there was no neural injection, and the procedure  was completed without difficulty.  Thank You.             [Transvaginal Ultrasound] : transvaginal ultrasound [Retroverted] : retroverted [L: ___ cm] : L: [unfilled] cm [W: ___cm] : W: [unfilled] cm [H: ___ cm] : H: [unfilled] cm [FreeTextEntry9] : postop after D&E [FreeTextEntry5] : EMS thin, 6.43 mm [FreeTextEntry4] : No evidence of rPOC.

## (undated) DEVICE — ANTIBACTERIAL UNDYED BRAIDED (POLYGLACTIN 910), SYNTHETIC ABSORBABLE SUTURE: Brand: COATED VICRYL

## (undated) DEVICE — CATH URETRL CONE/TIP 4.8F70CM LT

## (undated) DEVICE — SUT VIC 0 UR6 27IN VCP603H

## (undated) DEVICE — DECANT BG O JET

## (undated) DEVICE — CATH URETRL FLXITP POLLACK STD 5F 70CM

## (undated) DEVICE — [HIGH FLOW INSUFFLATOR,  DO NOT USE IF PACKAGE IS DAMAGED,  KEEP DRY,  KEEP AWAY FROM SUNLIGHT,  PROTECT FROM HEAT AND RADIOACTIVE SOURCES.]: Brand: PNEUMOSURE

## (undated) DEVICE — MANIP UTER RUMI 2 KOH EFFICIENT 3.5CM BL

## (undated) DEVICE — PK UROL DAVINCI 10

## (undated) DEVICE — FLTR PLUMEPORT LAP W/CONN STRL

## (undated) DEVICE — ARM DRAPE

## (undated) DEVICE — VESSEL SEALER EXTEND: Brand: ENDOWRIST

## (undated) DEVICE — SCOPE WARMER THAT PRE-WARMS MULTIPLE LAPAROSCOPES.: Brand: JOSNOE MEDICAL INC

## (undated) DEVICE — GOWN,REINF,POLY,ECL,PP SLV,XXL: Brand: MEDLINE

## (undated) DEVICE — SAFESECURE,SECUREMENT,FOLEY CATH,STERILE: Brand: MEDLINE

## (undated) DEVICE — ANTIBACTERIAL UNDYED BRAIDED (POLYGLACTIN 910), SYNTHETIC ABSORBABLE SURGICAL SUTURE: Brand: COATED VICRYL

## (undated) DEVICE — PK CYSTO-TUR BASIC 10

## (undated) DEVICE — SKIN AFFIX SURG ADHESIVE 72/CS 0.55ML: Brand: MEDLINE

## (undated) DEVICE — DRAPE,TOP,102X53,STERILE: Brand: MEDLINE

## (undated) DEVICE — Device

## (undated) DEVICE — NITINOL WIRE WITH HYDROPHILIC TIP: Brand: SENSOR

## (undated) DEVICE — GOWN,NON-REINFORCED,SIRUS,SET IN SLV,XL: Brand: MEDLINE

## (undated) DEVICE — COLUMN DRAPE

## (undated) DEVICE — VIOLET POLYDIOXANONE POLYMER, SYNTHETIC ABSORBABLE SUTURE CLIPS: Brand: LAPRA-TY

## (undated) DEVICE — ENDOPATH XCEL BLADELESS TROCARS WITH STABILITY SLEEVES: Brand: ENDOPATH XCEL

## (undated) DEVICE — LAP PORT CLOSURE GUIDES 5MM AND 10/12MM: Brand: LAP PORT CLOSURE GUIDES 5MM AND 10/12MM

## (undated) DEVICE — CANNULA SEAL

## (undated) DEVICE — KT POSTN TRENDELENBURG NBXL PAD WING STRAP TABL HDRST XLG

## (undated) DEVICE — CVR HNDL LIGHT RIGID

## (undated) DEVICE — TIP COVER ACCESSORY

## (undated) DEVICE — APL DUPLOSPRAYER MIS 40CM

## (undated) DEVICE — APPL CHLORAPREP TINTED 26ML TEAL

## (undated) DEVICE — PK MAJ GYN DAVINCI 10

## (undated) DEVICE — TROC ANCHORPORT BLADELES W/GRIP 12X120MM

## (undated) DEVICE — 3M™ DURAPORE™ SURGICAL TAPE 1538-3, 3 INCH X 10 YARD (7,5CM X 9,1M), 4 ROLLS/BOX: Brand: 3M™ DURAPORE™

## (undated) DEVICE — GLV SURG SENSICARE PI MIC PF SZ7.5 LF STRL

## (undated) DEVICE — BLADELESS OBTURATOR: Brand: WECK VISTA

## (undated) DEVICE — GLV SURG SIGNATURE TOUCH PF LTX 8 STRL BX/50

## (undated) DEVICE — IRRIGATOR TOOMEY 70CC

## (undated) DEVICE — GLV SURG SIGNATURE TOUCH PF LTX 8.5 STRL

## (undated) DEVICE — BNDG ADHS PLSTC 1X3IN LF

## (undated) DEVICE — DRP ADAPT ALLY UTER POSTN SYS 1P/U

## (undated) DEVICE — MANIP UTER RUMI TP 6.7MMX8CM BLU

## (undated) DEVICE — SUT PDS MONO 0 36 CT1 VIL PDP346H